# Patient Record
Sex: FEMALE | Race: WHITE | NOT HISPANIC OR LATINO | Employment: UNEMPLOYED | URBAN - NONMETROPOLITAN AREA
[De-identification: names, ages, dates, MRNs, and addresses within clinical notes are randomized per-mention and may not be internally consistent; named-entity substitution may affect disease eponyms.]

---

## 2017-02-11 ENCOUNTER — HOSPITAL ENCOUNTER (EMERGENCY)
Facility: HOSPITAL | Age: 22
Discharge: HOME | End: 2017-02-12
Attending: FAMILY MEDICINE | Admitting: FAMILY MEDICINE

## 2017-02-11 ENCOUNTER — APPOINTMENT (OUTPATIENT)
Dept: CT IMAGING | Facility: HOSPITAL | Age: 22
End: 2017-02-11

## 2017-02-11 DIAGNOSIS — N30.90 CYSTITIS: ICD-10-CM

## 2017-02-11 DIAGNOSIS — N83.202 CYST OF LEFT OVARY: Primary | ICD-10-CM

## 2017-02-11 LAB
ALBUMIN SERPL-MCNC: 4.2 G/DL (ref 3.5–5)
ALBUMIN/GLOB SERPL: 1.2 G/DL (ref 1.1–2.5)
ALP SERPL-CCNC: 87 U/L (ref 24–120)
ALT SERPL W P-5'-P-CCNC: 42 U/L (ref 0–54)
AMYLASE SERPL-CCNC: 64 U/L (ref 30–110)
ANION GAP SERPL CALCULATED.3IONS-SCNC: 12 MMOL/L (ref 4–13)
AST SERPL-CCNC: 37 U/L (ref 7–45)
BACTERIA UR QL AUTO: ABNORMAL /HPF
BASOPHILS # BLD AUTO: 0.04 10*3/MM3 (ref 0–0.2)
BASOPHILS NFR BLD AUTO: 0.3 % (ref 0–2)
BILIRUB SERPL-MCNC: 0.3 MG/DL (ref 0.1–1)
BILIRUB UR QL STRIP: NEGATIVE
BUN BLD-MCNC: 14 MG/DL (ref 5–21)
BUN/CREAT SERPL: 22.2 (ref 7–25)
CALCIUM SPEC-SCNC: 9.6 MG/DL (ref 8.4–10.4)
CHLORIDE SERPL-SCNC: 104 MMOL/L (ref 98–110)
CLARITY UR: ABNORMAL
CO2 SERPL-SCNC: 23 MMOL/L (ref 24–31)
COLOR UR: YELLOW
CREAT BLD-MCNC: 0.63 MG/DL (ref 0.5–1.4)
CRP SERPL-MCNC: 1.1 MG/DL (ref 0–0.99)
DEPRECATED RDW RBC AUTO: 46.5 FL (ref 40–54)
EOSINOPHIL # BLD AUTO: 0.17 10*3/MM3 (ref 0–0.7)
EOSINOPHIL NFR BLD AUTO: 1.3 % (ref 0–4)
ERYTHROCYTE [DISTWIDTH] IN BLOOD BY AUTOMATED COUNT: 15.3 % (ref 12–15)
GFR SERPL CREATININE-BSD FRML MDRD: 120 ML/MIN/1.73
GLOBULIN UR ELPH-MCNC: 3.5 GM/DL
GLUCOSE BLD-MCNC: 89 MG/DL (ref 70–100)
GLUCOSE UR STRIP-MCNC: NEGATIVE MG/DL
HCG INTACT+B SERPL-ACNC: <2.39 MIU/ML (ref 0–10)
HCT VFR BLD AUTO: 39 % (ref 37–47)
HGB BLD-MCNC: 13.1 G/DL (ref 12–16)
HGB UR QL STRIP.AUTO: ABNORMAL
HYALINE CASTS UR QL AUTO: ABNORMAL /LPF
IMM GRANULOCYTES # BLD: 0.04 10*3/MM3 (ref 0–0.03)
IMM GRANULOCYTES NFR BLD: 0.3 % (ref 0–5)
KETONES UR QL STRIP: NEGATIVE
LEUKOCYTE ESTERASE UR QL STRIP.AUTO: ABNORMAL
LIPASE SERPL-CCNC: 56 U/L (ref 23–203)
LYMPHOCYTES # BLD AUTO: 2.87 10*3/MM3 (ref 0.72–4.86)
LYMPHOCYTES NFR BLD AUTO: 22.2 % (ref 15–45)
MCH RBC QN AUTO: 28.1 PG (ref 28–32)
MCHC RBC AUTO-ENTMCNC: 33.6 G/DL (ref 33–36)
MCV RBC AUTO: 83.5 FL (ref 82–98)
MONOCYTES # BLD AUTO: 1.17 10*3/MM3 (ref 0.19–1.3)
MONOCYTES NFR BLD AUTO: 9 % (ref 4–12)
NEUTROPHILS # BLD AUTO: 8.66 10*3/MM3 (ref 1.87–8.4)
NEUTROPHILS NFR BLD AUTO: 66.9 % (ref 39–78)
NITRITE UR QL STRIP: POSITIVE
PH UR STRIP.AUTO: <=5 [PH] (ref 5–8)
PLATELET # BLD AUTO: 255 10*3/MM3 (ref 130–400)
PMV BLD AUTO: 10.9 FL (ref 6–12)
POTASSIUM BLD-SCNC: 3.7 MMOL/L (ref 3.5–5.3)
PROT SERPL-MCNC: 7.7 G/DL (ref 6.3–8.7)
PROT UR QL STRIP: ABNORMAL
RBC # BLD AUTO: 4.67 10*6/MM3 (ref 4.2–5.4)
RBC # UR: ABNORMAL /HPF
REF LAB TEST METHOD: ABNORMAL
SODIUM BLD-SCNC: 139 MMOL/L (ref 135–145)
SP GR UR STRIP: 1.02 (ref 1–1.03)
SQUAMOUS #/AREA URNS HPF: ABNORMAL /HPF
UROBILINOGEN UR QL STRIP: ABNORMAL
WBC NRBC COR # BLD: 12.95 10*3/MM3 (ref 4.8–10.8)
WBC UR QL AUTO: ABNORMAL /HPF

## 2017-02-11 PROCEDURE — 84702 CHORIONIC GONADOTROPIN TEST: CPT | Performed by: NURSE PRACTITIONER

## 2017-02-11 PROCEDURE — 85025 COMPLETE CBC W/AUTO DIFF WBC: CPT | Performed by: NURSE PRACTITIONER

## 2017-02-11 PROCEDURE — 81001 URINALYSIS AUTO W/SCOPE: CPT | Performed by: NURSE PRACTITIONER

## 2017-02-11 PROCEDURE — 82150 ASSAY OF AMYLASE: CPT | Performed by: NURSE PRACTITIONER

## 2017-02-11 PROCEDURE — 87186 SC STD MICRODIL/AGAR DIL: CPT | Performed by: NURSE PRACTITIONER

## 2017-02-11 PROCEDURE — 96361 HYDRATE IV INFUSION ADD-ON: CPT

## 2017-02-11 PROCEDURE — 87086 URINE CULTURE/COLONY COUNT: CPT | Performed by: NURSE PRACTITIONER

## 2017-02-11 PROCEDURE — 80053 COMPREHEN METABOLIC PANEL: CPT | Performed by: NURSE PRACTITIONER

## 2017-02-11 PROCEDURE — 99283 EMERGENCY DEPT VISIT LOW MDM: CPT

## 2017-02-11 PROCEDURE — 96374 THER/PROPH/DIAG INJ IV PUSH: CPT

## 2017-02-11 PROCEDURE — 25010000002 ONDANSETRON PER 1 MG: Performed by: NURSE PRACTITIONER

## 2017-02-11 PROCEDURE — 74176 CT ABD & PELVIS W/O CONTRAST: CPT

## 2017-02-11 PROCEDURE — 83690 ASSAY OF LIPASE: CPT | Performed by: NURSE PRACTITIONER

## 2017-02-11 PROCEDURE — 86140 C-REACTIVE PROTEIN: CPT | Performed by: NURSE PRACTITIONER

## 2017-02-11 RX ORDER — PRENATAL VIT NO.126/IRON/FOLIC 28MG-0.8MG
TABLET ORAL DAILY
COMMUNITY
End: 2020-01-03 | Stop reason: HOSPADM

## 2017-02-11 RX ORDER — ESCITALOPRAM OXALATE 20 MG/1
20 TABLET ORAL DAILY
COMMUNITY
End: 2020-01-03 | Stop reason: HOSPADM

## 2017-02-11 RX ORDER — ONDANSETRON 2 MG/ML
4 INJECTION INTRAMUSCULAR; INTRAVENOUS ONCE
Status: COMPLETED | OUTPATIENT
Start: 2017-02-11 | End: 2017-02-11

## 2017-02-11 RX ORDER — SODIUM CHLORIDE 0.9 % (FLUSH) 0.9 %
10 SYRINGE (ML) INJECTION AS NEEDED
Status: DISCONTINUED | OUTPATIENT
Start: 2017-02-11 | End: 2017-02-12 | Stop reason: HOSPADM

## 2017-02-11 RX ADMIN — ONDANSETRON 4 MG: 2 INJECTION INTRAMUSCULAR; INTRAVENOUS at 23:16

## 2017-02-11 RX ADMIN — SODIUM CHLORIDE 1000 ML: 9 INJECTION, SOLUTION INTRAVENOUS at 23:16

## 2017-02-12 ENCOUNTER — APPOINTMENT (OUTPATIENT)
Dept: ULTRASOUND IMAGING | Facility: HOSPITAL | Age: 22
End: 2017-02-12

## 2017-02-12 VITALS
DIASTOLIC BLOOD PRESSURE: 72 MMHG | HEART RATE: 69 BPM | RESPIRATION RATE: 16 BRPM | TEMPERATURE: 98.2 F | HEIGHT: 62 IN | WEIGHT: 207.5 LBS | OXYGEN SATURATION: 98 % | BODY MASS INDEX: 38.18 KG/M2 | SYSTOLIC BLOOD PRESSURE: 113 MMHG

## 2017-02-12 PROCEDURE — 76856 US EXAM PELVIC COMPLETE: CPT

## 2017-02-12 RX ORDER — CEFUROXIME AXETIL 500 MG/1
500 TABLET ORAL 2 TIMES DAILY
Qty: 20 TABLET | Refills: 0 | Status: SHIPPED | OUTPATIENT
Start: 2017-02-12 | End: 2017-02-27 | Stop reason: SINTOL

## 2017-02-12 RX ORDER — TRAMADOL HYDROCHLORIDE 50 MG/1
50 TABLET ORAL EVERY 8 HOURS PRN
Qty: 10 TABLET | Refills: 0 | OUTPATIENT
Start: 2017-02-12 | End: 2020-01-03 | Stop reason: HOSPADM

## 2017-02-12 NOTE — ED PROVIDER NOTES
Subjective   HPI Comments: Patient is a 21-year-old white female who presents here today with complaint of bilateral flank pain and difficulty with urination.  Patient reports symptoms been occurring for the past 2 days.  Patient reports that he has been having nausea for 2 days.  He also reports that she does as though her breast are getting bigger.  She reports she is concerned that she could be pregnant.  She states she took a home pregnancy test today however was negative.  Patient reports her LMP was 2017.  She is a  0.  Patient states she would like a blood pregnancy test.  Patient reports that she is having burning with urination.  She denies any blood in her urine she reports urinary frequency.  She presents here today for evaluation.    Patient is a 21 y.o. female presenting with flank pain.   History provided by:  Patient   used: No    Flank Pain   Pain location:  L flank and R flank  Pain quality: aching    Pain radiates to:  Does not radiate  Pain severity:  Mild  Onset quality:  Sudden  Duration:  2 days  Timing:  Constant  Progression:  Unchanged  Chronicity:  New  Context: not alcohol use, not awakening from sleep, not diet changes, not eating, not laxative use, not medication withdrawal, not previous surgeries, not recent illness, not recent sexual activity, not recent travel, not retching, not sick contacts, not suspicious food intake and not trauma    Relieved by:  Nothing  Worsened by:  Nothing  Ineffective treatments:  None tried  Associated symptoms: dysuria and nausea    Associated symptoms: no anorexia, no belching, no chest pain, no chills, no constipation, no cough, no diarrhea, no fatigue, no fever, no flatus, no hematemesis, no hematochezia, no hematuria, no melena, no shortness of breath, no sore throat, no vaginal bleeding, no vaginal discharge and no vomiting    Risk factors: no alcohol abuse, no aspirin use, not elderly, has not had multiple surgeries,  no NSAID use, not obese, not pregnant and no recent hospitalization        Review of Systems   Constitutional: Negative for chills, fatigue and fever.   HENT: Negative for sore throat.    Respiratory: Negative for cough and shortness of breath.    Cardiovascular: Negative for chest pain.   Gastrointestinal: Positive for nausea. Negative for anorexia, constipation, diarrhea, flatus, hematemesis, hematochezia, melena and vomiting.   Genitourinary: Positive for dysuria and flank pain. Negative for hematuria, vaginal bleeding and vaginal discharge.   All other systems reviewed and are negative.      Past Medical History   Diagnosis Date   • Depression        No Known Allergies    Past Surgical History   Procedure Laterality Date   • Ear tubes Bilateral        History reviewed. No pertinent family history.    Social History     Social History   • Marital status: Single     Spouse name: N/A   • Number of children: N/A   • Years of education: N/A     Social History Main Topics   • Smoking status: Current Every Day Smoker     Packs/day: 0.50     Years: 3.00     Types: Cigarettes   • Smokeless tobacco: None   • Alcohol use No   • Drug use: No   • Sexual activity: Defer     Other Topics Concern   • None     Social History Narrative   • None           Objective   Physical Exam   Constitutional: She is oriented to person, place, and time. She appears well-developed and well-nourished.   HENT:   Head: Normocephalic and atraumatic.   Eyes: Conjunctivae are normal. Pupils are equal, round, and reactive to light.   Neck: Normal range of motion. Neck supple.   Cardiovascular: Normal rate, regular rhythm and normal heart sounds.    Pulmonary/Chest: Effort normal and breath sounds normal.   Abdominal: Soft. Bowel sounds are normal. There is tenderness. There is CVA tenderness.   Musculoskeletal: Normal range of motion.   Neurological: She is alert and oriented to person, place, and time.   Skin: Skin is warm and dry.   Psychiatric:  She has a normal mood and affect.   Nursing note and vitals reviewed.      Procedures         ED Course  ED Course   Value Comment By Time   WBC: (!) 12.95 (Reviewed) Lissettefernando Raymond, APRN 02/11 3159    CT scan revealed left complex structure cyst radiology recommended evaluation by ultrasound.  Ultrasound revealed left complex hemorrhagic cyst.  There was no torsion good blood flow.  Patient diagnosed with urinary tract infection/cystitis without pyelonephritis.  Patient discharged in improved condition with prescription for Ultram for pain and Ceftin 1 mg twice a day ×10 days for cystitis. Tina Fuller,  02/12 0323    All findings discussed with patient she indicated agreement and understanding of the treatment plan.  Patient will return as needed or if symptoms change or worsen. Tina Fuller,  02/12 0323                  [facility]    Final diagnoses:   Cyst of left ovary   Cystitis            Tina Fuller,   02/12/17 0323

## 2017-02-12 NOTE — DISCHARGE INSTRUCTIONS

## 2017-02-13 LAB — BACTERIA SPEC AEROBE CULT: ABNORMAL

## 2017-02-27 ENCOUNTER — OFFICE VISIT (OUTPATIENT)
Dept: RETAIL CLINIC | Facility: CLINIC | Age: 22
End: 2017-02-27

## 2017-02-27 VITALS
DIASTOLIC BLOOD PRESSURE: 77 MMHG | OXYGEN SATURATION: 98 % | HEART RATE: 81 BPM | TEMPERATURE: 98.2 F | SYSTOLIC BLOOD PRESSURE: 103 MMHG

## 2017-02-27 DIAGNOSIS — M54.5 ACUTE MIDLINE LOW BACK PAIN, WITH SCIATICA PRESENCE UNSPECIFIED: ICD-10-CM

## 2017-02-27 DIAGNOSIS — J01.40 ACUTE PANSINUSITIS, RECURRENCE NOT SPECIFIED: Primary | ICD-10-CM

## 2017-02-27 LAB
BILIRUB BLD-MCNC: NEGATIVE MG/DL
CLARITY, POC: CLEAR
COLOR UR: YELLOW
GLUCOSE UR STRIP-MCNC: NEGATIVE MG/DL
KETONES UR QL: NEGATIVE
LEUKOCYTE EST, POC: NEGATIVE
NITRITE UR-MCNC: NEGATIVE MG/ML
PH UR: 6 [PH] (ref 5–8)
PROT UR STRIP-MCNC: NEGATIVE MG/DL
RBC # UR STRIP: ABNORMAL /UL
SP GR UR: 1.03 (ref 1–1.03)
UROBILINOGEN UR QL: NORMAL

## 2017-02-27 PROCEDURE — 81003 URINALYSIS AUTO W/O SCOPE: CPT | Performed by: NURSE PRACTITIONER

## 2017-02-27 PROCEDURE — 99213 OFFICE O/P EST LOW 20 MIN: CPT | Performed by: NURSE PRACTITIONER

## 2017-02-27 RX ORDER — AZITHROMYCIN 250 MG/1
TABLET, FILM COATED ORAL
Qty: 6 TABLET | Refills: 0 | OUTPATIENT
Start: 2017-02-27 | End: 2020-01-03 | Stop reason: HOSPADM

## 2017-02-27 RX ORDER — FLUTICASONE PROPIONATE 50 MCG
2 SPRAY, SUSPENSION (ML) NASAL DAILY
Qty: 1 EACH | Refills: 0 | Status: SHIPPED | OUTPATIENT
Start: 2017-02-27 | End: 2017-03-29

## 2017-02-27 NOTE — PROGRESS NOTES
"Subjective   Akilah Avila is a 21 y.o. female.     Earache    There is pain in both ears. The current episode started in the past 7 days (2-3 days). The problem has been gradually worsening. There has been no fever. Associated symptoms include coughing, headaches, rhinorrhea and a sore throat. Pertinent negatives include no diarrhea or vomiting. She has tried NSAIDs for the symptoms. The treatment provided no relief.   Back Pain   This is a chronic (But worse today; Recently in ER and told she has cyst and cystitis.  Did not finish antibiotic) problem. The problem has been gradually worsening since onset. The quality of the pain is described as shooting (Every once in a while). The symptoms are aggravated by sitting, bending and lying down. Associated symptoms include chest pain (Chest \"soreness\" Started this morning) and headaches. Pertinent negatives include no dysuria, fever or pelvic pain.        The following portions of the patient's history were reviewed and updated as appropriate: allergies, current medications, past family history, past medical history, past social history, past surgical history and problem list.    Review of Systems   Constitutional: Negative for fever.   HENT: Positive for ear pain, rhinorrhea and sore throat.    Respiratory: Positive for cough. Negative for shortness of breath.    Cardiovascular: Positive for chest pain (Chest \"soreness\" Started this morning). Negative for palpitations.   Gastrointestinal: Negative for diarrhea and vomiting.   Genitourinary: Negative for dysuria and pelvic pain.   Musculoskeletal: Positive for back pain.   Neurological: Positive for headaches.       Objective   Physical Exam   Constitutional: She appears well-developed and well-nourished. No distress.   HENT:   Right Ear: External ear normal. Tympanic membrane is not erythematous and not bulging.   Left Ear: External ear normal. Tympanic membrane is not erythematous and not bulging.   Nose: Right " sinus exhibits maxillary sinus tenderness and frontal sinus tenderness. Left sinus exhibits maxillary sinus tenderness and frontal sinus tenderness.   Mouth/Throat: Oropharynx is clear and moist. No oropharyngeal exudate or posterior oropharyngeal erythema. Tonsils are 2+ on the right. Tonsils are 2+ on the left. No tonsillar exudate.   Eyes: Conjunctivae are normal. Pupils are equal, round, and reactive to light. Right eye exhibits no discharge. Left eye exhibits no discharge.   Neck: Neck supple.   Cardiovascular: Normal rate, regular rhythm and normal heart sounds.  Exam reveals no gallop and no friction rub.    No murmur heard.  Able to reproduce chest discomfort with slouching and moving arm certain ways.    Pulmonary/Chest: Effort normal and breath sounds normal. No respiratory distress. She has no wheezes. She has no rales. She exhibits tenderness.   Lymphadenopathy:     She has no cervical adenopathy.   Neurological: She is alert.   Skin: Skin is warm. She is not diaphoretic.   Psychiatric: She has a normal mood and affect. Her behavior is normal.       Assessment/Plan   Akilah was seen today for earache and back pain.    Diagnoses and all orders for this visit:    Acute pansinusitis, recurrence not specified  -     POC Urinalysis Dipstick, Automated    Acute midline low back pain, with sciatica presence unspecified    Other orders  -     fluticasone (FLONASE) 50 MCG/ACT nasal spray; 2 sprays into each nostril Daily for 30 days.  -     azithromycin (ZITHROMAX Z-JESSICA) 250 MG tablet; Take 2 tablets the first day, then 1 tablet daily for 4 days.      Increase fluid intake  Warm salt water gargles as needed for sore throat  Do not over suppress cough  If no improvement over next 2-3 days or symptoms worsen, follow up with PCP  Chest wall pain.  If Chest pain worsens, you become SOB or other concerning symptoms start, go to ER!!  Follow up with GYN regarding Cyst.

## 2017-02-27 NOTE — PATIENT INSTRUCTIONS
Increase fluid intake  Warm salt water gargles as needed for sore throat  Do not over suppress cough  If no improvement over next 2-3 days or symptoms worsen, follow up with PCP  If Chest pain worsens, you become SOB or other concerning symptoms start, go to ER!!  Follow up with GYN regarding Cyst.  Watch and wait on antibiotic. May start in next 2-3 days is symptoms worsen.  If significantly worse, or your develop fever, follow up with PCP or go to Urgent Care      Sinusitis, Adult  Sinusitis is redness, soreness, and inflammation of the paranasal sinuses. Paranasal sinuses are air pockets within the bones of your face. They are located beneath your eyes, in the middle of your forehead, and above your eyes. In healthy paranasal sinuses, mucus is able to drain out, and air is able to circulate through them by way of your nose. However, when your paranasal sinuses are inflamed, mucus and air can become trapped. This can allow bacteria and other germs to grow and cause infection.  Sinusitis can develop quickly and last only a short time (acute) or continue over a long period (chronic). Sinusitis that lasts for more than 12 weeks is considered chronic.  CAUSES  Causes of sinusitis include:  · Allergies.  · Structural abnormalities, such as displacement of the cartilage that separates your nostrils (deviated septum), which can decrease the air flow through your nose and sinuses and affect sinus drainage.  · Functional abnormalities, such as when the small hairs (cilia) that line your sinuses and help remove mucus do not work properly or are not present.  SIGNS AND SYMPTOMS  Symptoms of acute and chronic sinusitis are the same. The primary symptoms are pain and pressure around the affected sinuses. Other symptoms include:  · Upper toothache.  · Earache.  · Headache.  · Bad breath.  · Decreased sense of smell and taste.  · A cough, which worsens when you are lying flat.  · Fatigue.  · Fever.  · Thick drainage from your  nose, which often is green and may contain pus (purulent).  · Swelling and warmth over the affected sinuses.  DIAGNOSIS  Your health care provider will perform a physical exam. During your exam, your health care provider may perform any of the following to help determine if you have acute sinusitis or chronic sinusitis:  · Look in your nose for signs of abnormal growths in your nostrils (nasal polyps).  · Tap over the affected sinus to check for signs of infection.  · View the inside of your sinuses using an imaging device that has a light attached (endoscope).  If your health care provider suspects that you have chronic sinusitis, one or more of the following tests may be recommended:  · Allergy tests.  · Nasal culture. A sample of mucus is taken from your nose, sent to a lab, and screened for bacteria.  · Nasal cytology. A sample of mucus is taken from your nose and examined by your health care provider to determine if your sinusitis is related to an allergy.  TREATMENT  Most cases of acute sinusitis are related to a viral infection and will resolve on their own within 10 days. Sometimes, medicines are prescribed to help relieve symptoms of both acute and chronic sinusitis. These may include pain medicines, decongestants, nasal steroid sprays, or saline sprays.  However, for sinusitis related to a bacterial infection, your health care provider will prescribe antibiotic medicines. These are medicines that will help kill the bacteria causing the infection.  Rarely, sinusitis is caused by a fungal infection. In these cases, your health care provider will prescribe antifungal medicine.  For some cases of chronic sinusitis, surgery is needed. Generally, these are cases in which sinusitis recurs more than 3 times per year, despite other treatments.  HOME CARE INSTRUCTIONS  · Drink plenty of water. Water helps thin the mucus so your sinuses can drain more easily.  · Use a humidifier.  · Inhale steam 3-4 times a day (for  example, sit in the bathroom with the shower running).  · Apply a warm, moist washcloth to your face 3-4 times a day, or as directed by your health care provider.  · Use saline nasal sprays to help moisten and clean your sinuses.  · Take medicines only as directed by your health care provider.  · If you were prescribed either an antibiotic or antifungal medicine, finish it all even if you start to feel better.  SEEK IMMEDIATE MEDICAL CARE IF:  · You have increasing pain or severe headaches.  · You have nausea, vomiting, or drowsiness.  · You have swelling around your face.  · You have vision problems.  · You have a stiff neck.  · You have difficulty breathing.     This information is not intended to replace advice given to you by your health care provider. Make sure you discuss any questions you have with your health care provider.     Document Released: 12/18/2006 Document Revised: 01/08/2016 Document Reviewed: 10/12/2016  [employer] Patient Education ©2016 [employer]

## 2017-03-22 PROCEDURE — 99283 EMERGENCY DEPT VISIT LOW MDM: CPT

## 2017-03-23 ENCOUNTER — HOSPITAL ENCOUNTER (EMERGENCY)
Facility: HOSPITAL | Age: 22
Discharge: HOME | End: 2017-03-23
Attending: EMERGENCY MEDICINE | Admitting: EMERGENCY MEDICINE

## 2017-03-23 ENCOUNTER — APPOINTMENT (OUTPATIENT)
Dept: GENERAL RADIOLOGY | Facility: HOSPITAL | Age: 22
End: 2017-03-23

## 2017-03-23 VITALS
HEIGHT: 62 IN | BODY MASS INDEX: 39.75 KG/M2 | TEMPERATURE: 97.7 F | OXYGEN SATURATION: 100 % | SYSTOLIC BLOOD PRESSURE: 127 MMHG | HEART RATE: 72 BPM | RESPIRATION RATE: 15 BRPM | DIASTOLIC BLOOD PRESSURE: 68 MMHG | WEIGHT: 216 LBS

## 2017-03-23 DIAGNOSIS — R05.9 COUGH: Primary | ICD-10-CM

## 2017-03-23 DIAGNOSIS — G43.009 MIGRAINE WITHOUT AURA AND WITHOUT STATUS MIGRAINOSUS, NOT INTRACTABLE: ICD-10-CM

## 2017-03-23 LAB
ALBUMIN SERPL-MCNC: 3.9 G/DL (ref 3.5–5)
ALBUMIN/GLOB SERPL: 1.1 G/DL (ref 1.1–2.5)
ALP SERPL-CCNC: 82 U/L (ref 24–120)
ALT SERPL W P-5'-P-CCNC: 23 U/L (ref 0–54)
AMYLASE SERPL-CCNC: 63 U/L (ref 30–110)
ANION GAP SERPL CALCULATED.3IONS-SCNC: 11 MMOL/L (ref 4–13)
AST SERPL-CCNC: 33 U/L (ref 7–45)
BASOPHILS # BLD AUTO: 0.03 10*3/MM3 (ref 0–0.2)
BASOPHILS NFR BLD AUTO: 0.3 % (ref 0–2)
BILIRUB SERPL-MCNC: 0.3 MG/DL (ref 0.1–1)
BILIRUB UR QL STRIP: NEGATIVE
BUN BLD-MCNC: 10 MG/DL (ref 5–21)
BUN/CREAT SERPL: 17.5 (ref 7–25)
CALCIUM SPEC-SCNC: 8.9 MG/DL (ref 8.4–10.4)
CHLORIDE SERPL-SCNC: 105 MMOL/L (ref 98–110)
CLARITY UR: ABNORMAL
CO2 SERPL-SCNC: 26 MMOL/L (ref 24–31)
COLOR UR: YELLOW
CREAT BLD-MCNC: 0.57 MG/DL (ref 0.5–1.4)
D DIMER PPP FEU-MCNC: <0.22 MG/L (FEU) (ref 0–0.5)
DEPRECATED RDW RBC AUTO: 45.9 FL (ref 40–54)
EOSINOPHIL # BLD AUTO: 0.22 10*3/MM3 (ref 0–0.7)
EOSINOPHIL NFR BLD AUTO: 2.5 % (ref 0–4)
ERYTHROCYTE [DISTWIDTH] IN BLOOD BY AUTOMATED COUNT: 15.1 % (ref 12–15)
GFR SERPL CREATININE-BSD FRML MDRD: 135 ML/MIN/1.73
GLOBULIN UR ELPH-MCNC: 3.5 GM/DL
GLUCOSE BLD-MCNC: 99 MG/DL (ref 70–100)
GLUCOSE UR STRIP-MCNC: NEGATIVE MG/DL
HCG SERPL QL: NEGATIVE
HCT VFR BLD AUTO: 38.6 % (ref 37–47)
HGB BLD-MCNC: 12.8 G/DL (ref 12–16)
HGB UR QL STRIP.AUTO: NEGATIVE
IMM GRANULOCYTES # BLD: 0.02 10*3/MM3 (ref 0–0.03)
IMM GRANULOCYTES NFR BLD: 0.2 % (ref 0–5)
KETONES UR QL STRIP: NEGATIVE
LEUKOCYTE ESTERASE UR QL STRIP.AUTO: NEGATIVE
LIPASE SERPL-CCNC: 53 U/L (ref 23–203)
LYMPHOCYTES # BLD AUTO: 2.88 10*3/MM3 (ref 0.72–4.86)
LYMPHOCYTES NFR BLD AUTO: 32.7 % (ref 15–45)
MCH RBC QN AUTO: 27.6 PG (ref 28–32)
MCHC RBC AUTO-ENTMCNC: 33.2 G/DL (ref 33–36)
MCV RBC AUTO: 83.2 FL (ref 82–98)
MONOCYTES # BLD AUTO: 0.72 10*3/MM3 (ref 0.19–1.3)
MONOCYTES NFR BLD AUTO: 8.2 % (ref 4–12)
NEUTROPHILS # BLD AUTO: 4.94 10*3/MM3 (ref 1.87–8.4)
NEUTROPHILS NFR BLD AUTO: 56.1 % (ref 39–78)
NITRITE UR QL STRIP: NEGATIVE
NT-PROBNP SERPL-MCNC: <12 PG/ML (ref 0–450)
PH UR STRIP.AUTO: 7 [PH] (ref 5–8)
PLATELET # BLD AUTO: 267 10*3/MM3 (ref 130–400)
PMV BLD AUTO: 10.2 FL (ref 6–12)
POTASSIUM BLD-SCNC: 4 MMOL/L (ref 3.5–5.3)
PROT SERPL-MCNC: 7.4 G/DL (ref 6.3–8.7)
PROT UR QL STRIP: NEGATIVE
RBC # BLD AUTO: 4.64 10*6/MM3 (ref 4.2–5.4)
SODIUM BLD-SCNC: 142 MMOL/L (ref 135–145)
SP GR UR STRIP: 1.02 (ref 1–1.03)
UROBILINOGEN UR QL STRIP: ABNORMAL
WBC NRBC COR # BLD: 8.81 10*3/MM3 (ref 4.8–10.8)

## 2017-03-23 PROCEDURE — 85025 COMPLETE CBC W/AUTO DIFF WBC: CPT | Performed by: EMERGENCY MEDICINE

## 2017-03-23 PROCEDURE — 83880 ASSAY OF NATRIURETIC PEPTIDE: CPT | Performed by: EMERGENCY MEDICINE

## 2017-03-23 PROCEDURE — 81003 URINALYSIS AUTO W/O SCOPE: CPT | Performed by: EMERGENCY MEDICINE

## 2017-03-23 PROCEDURE — 85379 FIBRIN DEGRADATION QUANT: CPT | Performed by: EMERGENCY MEDICINE

## 2017-03-23 PROCEDURE — 80053 COMPREHEN METABOLIC PANEL: CPT | Performed by: EMERGENCY MEDICINE

## 2017-03-23 PROCEDURE — 82150 ASSAY OF AMYLASE: CPT | Performed by: EMERGENCY MEDICINE

## 2017-03-23 PROCEDURE — 84703 CHORIONIC GONADOTROPIN ASSAY: CPT | Performed by: EMERGENCY MEDICINE

## 2017-03-23 PROCEDURE — 71020 HC CHEST PA AND LATERAL: CPT

## 2017-03-23 PROCEDURE — 83690 ASSAY OF LIPASE: CPT | Performed by: EMERGENCY MEDICINE

## 2017-03-23 RX ORDER — SULFAMETHOXAZOLE AND TRIMETHOPRIM 800; 160 MG/1; MG/1
1 TABLET ORAL 2 TIMES DAILY
Qty: 6 TABLET | Refills: 0 | Status: SHIPPED | OUTPATIENT
Start: 2017-03-23 | End: 2017-03-26

## 2017-03-23 NOTE — ED PROVIDER NOTES
Subjective   HPI Comments: Patient is a 21-year-old female who reports that she has had low back pain, nausea, cough productive for green phlegm, shortness of breath and intermittent global headache for the last 5 days.  Patient reports that her headache has been worsened by bright light and resolved by Excedrin for her headache.  Patient reports that she saw her primary care provider this Wednesday (03/22/17) and was tested negative for pregnancy.  Patient reports that she was diagnosed with a left ovarian cyst about one month ago.      History provided by:  Patient      Review of Systems   Constitutional: Negative.    Eyes: Negative.    Respiratory: Positive for cough and shortness of breath.    Cardiovascular: Negative.    Gastrointestinal: Positive for nausea.   Endocrine: Negative.    Genitourinary: Negative.    Musculoskeletal: Positive for back pain.   Skin: Negative.    Allergic/Immunologic: Negative.    Neurological: Positive for headaches.   Hematological: Negative.    Psychiatric/Behavioral: Negative.    All other systems reviewed and are negative.      Past Medical History:   Diagnosis Date   • Back pain    • Bipolar disorder    • Depression        No Known Allergies    Past Surgical History:   Procedure Laterality Date   • EAR TUBES Bilateral        History reviewed. No pertinent family history.    Social History     Social History   • Marital status:      Spouse name: N/A   • Number of children: N/A   • Years of education: N/A     Social History Main Topics   • Smoking status: Current Every Day Smoker     Packs/day: 0.50     Years: 3.00     Types: Cigarettes   • Smokeless tobacco: None   • Alcohol use No   • Drug use: No   • Sexual activity: Defer     Other Topics Concern   • None     Social History Narrative   • None           Objective   Physical Exam   Constitutional: She is oriented to person, place, and time. She appears well-developed and well-nourished.   HENT:   Head: Normocephalic and  atraumatic.   Right Ear: External ear normal.   Left Ear: External ear normal.   Nose: Nose normal.   Mouth/Throat: Oropharynx is clear and moist.   Eyes: Conjunctivae and EOM are normal. Pupils are equal, round, and reactive to light. Right eye exhibits no discharge. Left eye exhibits no discharge.   Neck: Normal range of motion. Neck supple. No tracheal deviation present. No thyromegaly present.   Cardiovascular: Normal rate, regular rhythm, normal heart sounds and intact distal pulses.    No murmur heard.  Pulmonary/Chest: Effort normal and breath sounds normal. No respiratory distress. She exhibits no tenderness.   Abdominal: Soft. She exhibits no distension. There is tenderness (Mild left mid abdominal pain to palpation.).   Musculoskeletal: Normal range of motion. She exhibits no edema, tenderness or deformity.   Neurological: She is alert and oriented to person, place, and time. No cranial nerve deficit.   Skin: Skin is warm and dry. No erythema. No pallor.   Psychiatric: She has a normal mood and affect. Judgment normal.   Nursing note and vitals reviewed.      Procedures         ED Course  ED Course                  MDM  Number of Diagnoses or Management Options  Cough: minor  Migraine without aura and without status migrainosus, not intractable: minor     Amount and/or Complexity of Data Reviewed  Clinical lab tests: ordered and reviewed  Tests in the radiology section of CPT®: ordered and reviewed  Independent visualization of images, tracings, or specimens: yes    Risk of Complications, Morbidity, and/or Mortality  Presenting problems: moderate  Diagnostic procedures: moderate  Management options: low    Patient Progress  Patient progress: stable      Final diagnoses:   Cough   Migraine without aura and without status migrainosus, not intractable            Lauro Hollingsworth MD  03/23/17 8843

## 2019-06-17 ENCOUNTER — HOSPITAL ENCOUNTER (EMERGENCY)
Facility: HOSPITAL | Age: 24
Discharge: HOME | End: 2019-06-17
Admitting: EMERGENCY MEDICINE

## 2019-06-17 VITALS
DIASTOLIC BLOOD PRESSURE: 71 MMHG | SYSTOLIC BLOOD PRESSURE: 118 MMHG | OXYGEN SATURATION: 96 % | HEART RATE: 86 BPM | RESPIRATION RATE: 16 BRPM | BODY MASS INDEX: 32.76 KG/M2 | HEIGHT: 62 IN | WEIGHT: 178 LBS | TEMPERATURE: 97.4 F

## 2019-06-17 DIAGNOSIS — J02.9 SORE THROAT: Primary | ICD-10-CM

## 2019-06-17 DIAGNOSIS — R31.9 HEMATURIA, UNSPECIFIED TYPE: ICD-10-CM

## 2019-06-17 DIAGNOSIS — M54.41 ACUTE BILATERAL LOW BACK PAIN WITH RIGHT-SIDED SCIATICA: ICD-10-CM

## 2019-06-17 LAB
B-HCG UR QL: NEGATIVE
BACTERIA UR QL AUTO: ABNORMAL /HPF
BILIRUB UR QL STRIP: NEGATIVE
CLARITY UR: CLEAR
COLOR UR: YELLOW
GLUCOSE UR STRIP-MCNC: NEGATIVE MG/DL
HGB UR QL STRIP.AUTO: ABNORMAL
HYALINE CASTS UR QL AUTO: ABNORMAL /LPF
INTERNAL NEGATIVE CONTROL: NEGATIVE
INTERNAL POSITIVE CONTROL: POSITIVE
KETONES UR QL STRIP: NEGATIVE
LEUKOCYTE ESTERASE UR QL STRIP.AUTO: NEGATIVE
Lab: NORMAL
NITRITE UR QL STRIP: NEGATIVE
PH UR STRIP.AUTO: 6 [PH] (ref 5–8)
PROT UR QL STRIP: NEGATIVE
RBC # UR: ABNORMAL /HPF
REF LAB TEST METHOD: ABNORMAL
S PYO AG THROAT QL: NEGATIVE
SP GR UR STRIP: 1.01 (ref 1–1.03)
SQUAMOUS #/AREA URNS HPF: ABNORMAL /HPF
UROBILINOGEN UR QL STRIP: ABNORMAL
WBC UR QL AUTO: ABNORMAL /HPF

## 2019-06-17 PROCEDURE — 25010000002 ORPHENADRINE CITRATE PER 60 MG: Performed by: PHYSICIAN ASSISTANT

## 2019-06-17 PROCEDURE — 87081 CULTURE SCREEN ONLY: CPT | Performed by: PHYSICIAN ASSISTANT

## 2019-06-17 PROCEDURE — 87880 STREP A ASSAY W/OPTIC: CPT | Performed by: PHYSICIAN ASSISTANT

## 2019-06-17 PROCEDURE — 81025 URINE PREGNANCY TEST: CPT | Performed by: PHYSICIAN ASSISTANT

## 2019-06-17 PROCEDURE — 87077 CULTURE AEROBIC IDENTIFY: CPT | Performed by: PHYSICIAN ASSISTANT

## 2019-06-17 PROCEDURE — 96372 THER/PROPH/DIAG INJ SC/IM: CPT

## 2019-06-17 PROCEDURE — 99283 EMERGENCY DEPT VISIT LOW MDM: CPT

## 2019-06-17 PROCEDURE — 81001 URINALYSIS AUTO W/SCOPE: CPT | Performed by: PHYSICIAN ASSISTANT

## 2019-06-17 PROCEDURE — 25010000002 KETOROLAC TROMETHAMINE PER 15 MG: Performed by: PHYSICIAN ASSISTANT

## 2019-06-17 RX ORDER — ORPHENADRINE CITRATE 30 MG/ML
60 INJECTION INTRAMUSCULAR; INTRAVENOUS ONCE
Status: COMPLETED | OUTPATIENT
Start: 2019-06-17 | End: 2019-06-17

## 2019-06-17 RX ORDER — KETOROLAC TROMETHAMINE 15 MG/ML
15 INJECTION, SOLUTION INTRAMUSCULAR; INTRAVENOUS ONCE
Status: COMPLETED | OUTPATIENT
Start: 2019-06-17 | End: 2019-06-17

## 2019-06-17 RX ORDER — CEPHALEXIN 500 MG/1
500 CAPSULE ORAL 2 TIMES DAILY
Qty: 10 CAPSULE | Refills: 0 | Status: SHIPPED | OUTPATIENT
Start: 2019-06-17 | End: 2019-06-22

## 2019-06-17 RX ORDER — CYCLOBENZAPRINE HCL 5 MG
5 TABLET ORAL 3 TIMES DAILY PRN
Qty: 10 TABLET | Refills: 0 | Status: SHIPPED | OUTPATIENT
Start: 2019-06-17 | End: 2019-06-27

## 2019-06-17 RX ADMIN — ORPHENADRINE CITRATE 60 MG: 30 INJECTION INTRAMUSCULAR; INTRAVENOUS at 15:27

## 2019-06-17 RX ADMIN — KETOROLAC TROMETHAMINE 15 MG: 15 INJECTION, SOLUTION INTRAMUSCULAR; INTRAVENOUS at 15:28

## 2019-06-20 LAB — BACTERIA SPEC AEROBE CULT: ABNORMAL

## 2020-01-03 ENCOUNTER — HOSPITAL ENCOUNTER (EMERGENCY)
Facility: HOSPITAL | Age: 25
Discharge: HOME | End: 2020-01-03
Admitting: EMERGENCY MEDICINE

## 2020-01-03 ENCOUNTER — APPOINTMENT (OUTPATIENT)
Dept: GENERAL RADIOLOGY | Facility: HOSPITAL | Age: 25
End: 2020-01-03

## 2020-01-03 VITALS
RESPIRATION RATE: 18 BRPM | HEIGHT: 62 IN | WEIGHT: 186 LBS | SYSTOLIC BLOOD PRESSURE: 122 MMHG | HEART RATE: 98 BPM | BODY MASS INDEX: 34.23 KG/M2 | OXYGEN SATURATION: 99 % | TEMPERATURE: 98.8 F | DIASTOLIC BLOOD PRESSURE: 73 MMHG

## 2020-01-03 DIAGNOSIS — J06.9 UPPER RESPIRATORY TRACT INFECTION, UNSPECIFIED TYPE: ICD-10-CM

## 2020-01-03 DIAGNOSIS — Z34.90 PREGNANCY, UNSPECIFIED GESTATIONAL AGE: ICD-10-CM

## 2020-01-03 DIAGNOSIS — J10.1 INFLUENZA B: Primary | ICD-10-CM

## 2020-01-03 LAB
B-HCG UR QL: POSITIVE
FLUAV AG NPH QL: NEGATIVE
FLUBV AG NPH QL IA: POSITIVE
INTERNAL NEGATIVE CONTROL: NEGATIVE
INTERNAL POSITIVE CONTROL: POSITIVE
Lab: ABNORMAL
S PYO AG THROAT QL: NEGATIVE

## 2020-01-03 PROCEDURE — 81025 URINE PREGNANCY TEST: CPT | Performed by: NURSE PRACTITIONER

## 2020-01-03 PROCEDURE — 99283 EMERGENCY DEPT VISIT LOW MDM: CPT

## 2020-01-03 PROCEDURE — 94640 AIRWAY INHALATION TREATMENT: CPT

## 2020-01-03 PROCEDURE — 87081 CULTURE SCREEN ONLY: CPT | Performed by: NURSE PRACTITIONER

## 2020-01-03 PROCEDURE — 87880 STREP A ASSAY W/OPTIC: CPT | Performed by: NURSE PRACTITIONER

## 2020-01-03 PROCEDURE — 87804 INFLUENZA ASSAY W/OPTIC: CPT | Performed by: NURSE PRACTITIONER

## 2020-01-03 RX ORDER — IPRATROPIUM BROMIDE AND ALBUTEROL SULFATE 2.5; .5 MG/3ML; MG/3ML
3 SOLUTION RESPIRATORY (INHALATION) ONCE
Status: COMPLETED | OUTPATIENT
Start: 2020-01-03 | End: 2020-01-03

## 2020-01-03 RX ORDER — OSELTAMIVIR PHOSPHATE 75 MG/1
75 CAPSULE ORAL 2 TIMES DAILY
Qty: 10 CAPSULE | Refills: 0 | Status: SHIPPED | OUTPATIENT
Start: 2020-01-03 | End: 2020-01-08

## 2020-01-03 RX ORDER — CETIRIZINE HYDROCHLORIDE 10 MG/1
10 TABLET ORAL DAILY
Qty: 20 TABLET | Refills: 0 | Status: SHIPPED | OUTPATIENT
Start: 2020-01-03 | End: 2020-02-20

## 2020-01-03 RX ORDER — ALBUTEROL SULFATE 90 UG/1
2 AEROSOL, METERED RESPIRATORY (INHALATION) EVERY 4 HOURS PRN
Qty: 18 G | Refills: 0 | Status: SHIPPED | OUTPATIENT
Start: 2020-01-03 | End: 2020-02-20

## 2020-01-03 RX ORDER — AZITHROMYCIN 250 MG/1
TABLET, FILM COATED ORAL
Qty: 6 TABLET | Refills: 0 | Status: SHIPPED | OUTPATIENT
Start: 2020-01-03 | End: 2020-02-20

## 2020-01-03 RX ADMIN — IPRATROPIUM BROMIDE AND ALBUTEROL SULFATE 3 ML: 2.5; .5 SOLUTION RESPIRATORY (INHALATION) at 20:32

## 2020-01-04 NOTE — DISCHARGE INSTRUCTIONS
"  Cool Mist Vaporizer  A cool mist vaporizer is a device that releases a cool mist into the air. If you have a cough or a cold, using a vaporizer may help relieve your symptoms. The mist adds moisture to the air, which may help thin your mucus and make it less sticky. When your mucus is thin and less sticky, it easier for you to breathe and to cough up secretions.  Do not use a vaporizer if you are allergic to mold.  Follow these instructions at home:  · Follow the instructions that come with the vaporizer.  · Do not use anything other than distilled water in the vaporizer.  · Do not run the vaporizer all of the time. Doing that can cause mold or bacteria to grow in the vaporizer.  · Clean the vaporizer after each time that you use it.  · Clean and dry the vaporizer well before storing it.  · Stop using the vaporizer if your breathing symptoms get worse.  This information is not intended to replace advice given to you by your health care provider. Make sure you discuss any questions you have with your health care provider.  Document Released: 09/14/2005 Document Revised: 07/07/2017 Document Reviewed: 03/18/2017  [employer] Interactive Patient Education © 2019 [employer]      Influenza, Adult  Influenza is also called \"the flu.\" It is an infection in the lungs, nose, and throat (respiratory tract). It is caused by a virus. The flu causes symptoms that are similar to symptoms of a cold. It also causes a high fever and body aches.  The flu spreads easily from person to person (is contagious). Getting a flu shot (influenza vaccination) every year is the best way to prevent the flu.  What are the causes?  This condition is caused by the influenza virus. You can get the virus by:  · Breathing in droplets that are in the air from the cough or sneeze of a person who has the virus.  · Touching something that has the virus on it (is contaminated) and then touching your mouth, nose, or eyes.  What increases the risk?  Certain " things may make you more likely to get the flu. These include:  · Not washing your hands often.  · Having close contact with many people during cold and flu season.  · Touching your mouth, eyes, or nose without first washing your hands.  · Not getting a flu shot every year.  You may have a higher risk for the flu, along with serious problems such as a lung infection (pneumonia), if you:  · Are older than 65.  · Are pregnant.  · Have a weakened disease-fighting system (immune system) because of a disease or taking certain medicines.  · Have a long-term (chronic) illness, such as:  ? Heart, kidney, or lung disease.  ? Diabetes.  ? Asthma.  · Have a liver disorder.  · Are very overweight (morbidly obese).  · Have anemia. This is a condition that affects your red blood cells.  What are the signs or symptoms?  Symptoms usually begin suddenly and last 4-14 days. They may include:  · Fever and chills.  · Headaches, body aches, or muscle aches.  · Sore throat.  · Cough.  · Runny or stuffy (congested) nose.  · Chest discomfort.  · Not wanting to eat as much as normal (poor appetite).  · Weakness or feeling tired (fatigue).  · Dizziness.  · Feeling sick to your stomach (nauseous) or throwing up (vomiting).  How is this treated?  If the flu is found early, you can be treated with medicine that can help reduce how bad the illness is and how long it lasts (antiviral medicine). This may be given by mouth (orally) or through an IV tube.  Taking care of yourself at home can help your symptoms get better. Your doctor may suggest:  · Taking over-the-counter medicines.  · Drinking plenty of fluids.  The flu often goes away on its own. If you have very bad symptoms or other problems, you may be treated in a hospital.  Follow these instructions at home:         Activity  · Rest as needed. Get plenty of sleep.  · Stay home from work or school as told by your doctor.  ? Do not leave home until you do not have a fever for 24 hours without  taking medicine.  ? Leave home only to visit your doctor.  Eating and drinking  · Take an ORS (oral rehydration solution). This is a drink that is sold at pharmacies and stores.  · Drink enough fluid to keep your pee (urine) pale yellow.  · Drink clear fluids in small amounts as you are able. Clear fluids include:  ? Water.  ? Ice chips.  ? Fruit juice that has water added (diluted fruit juice).  ? Low-calorie sports drinks.  · Eat bland, easy-to-digest foods in small amounts as you are able. These foods include:  ? Bananas.  ? Applesauce.  ? Rice.  ? Lean meats.  ? Toast.  ? Crackers.  · Do not eat or drink:  ? Fluids that have a lot of sugar or caffeine.  ? Alcohol.  ? Spicy or fatty foods.  General instructions  · Take over-the-counter and prescription medicines only as told by your doctor.  · Use a cool mist humidifier to add moisture to the air in your home. This can make it easier for you to breathe.  · Cover your mouth and nose when you cough or sneeze.  · Wash your hands with soap and water often, especially after you cough or sneeze. If you cannot use soap and water, use alcohol-based hand .  · Keep all follow-up visits as told by your doctor. This is important.  How is this prevented?    · Get a flu shot every year. You may get the flu shot in late summer, fall, or winter. Ask your doctor when you should get your flu shot.  · Avoid contact with people who are sick during fall and winter (cold and flu season).  Contact a doctor if:  · You get new symptoms.  · You have:  ? Chest pain.  ? Watery poop (diarrhea).  ? A fever.  · Your cough gets worse.  · You start to have more mucus.  · You feel sick to your stomach.  · You throw up.  Get help right away if you:  · Have shortness of breath.  · Have trouble breathing.  · Have skin or nails that turn a bluish color.  · Have very bad pain or stiffness in your neck.  · Get a sudden headache.  · Get sudden pain in your face or ear.  · Cannot eat or drink  "without throwing up.  Summary  · Influenza (\"the flu\") is an infection in the lungs, nose, and throat. It is caused by a virus.  · Take over-the-counter and prescription medicines only as told by your doctor.  · Getting a flu shot every year is the best way to avoid getting the flu.  This information is not intended to replace advice given to you by your health care provider. Make sure you discuss any questions you have with your health care provider.  Document Released: 09/26/2009 Document Revised: 06/05/2019 Document Reviewed: 06/05/2019  [employer] Interactive Patient Education © 2019 [employer]      Pregnancy and Influenza    Influenza, also called the flu, is an infection of the lungs and airways (respiratory tract). If you are pregnant, you are more likely to catch the flu. You are also more likely to have a more serious case of the flu. This is because pregnancy causes changes to your body’s disease-fighting system (immune system), heart, and lungs. If you develop a bad case of the flu, especially with a high fever, this can cause problems for you and your developing baby.  How do people get the flu?  The flu is caused by a type of germ called a virus. It spreads when virus particles get passed from person to person by:  · Being near a sick person who is coughing or sneezing.  · Touching something that has the virus on it and then touching your mouth, nose, or face.  The influenza virus is most common during the fall and winter.  How can I protect myself against the flu?  · Get a flu shot. The best way to prevent the flu is to get a flu shot before flu season starts. The flu shot is not dangerous for your developing baby. It may even help protect your baby from the flu for up to 6 months after birth.  · Wash your hands often with soap and warm water. If soap and water are not available, use hand .  · Do not come in close contact with sick people.  · Do not share food, drinks, or utensils with other " people.  · Avoid touching your eyes, nose, and mouth.  · Clean frequently used surfaces at home, school, or work.  · Practice healthy lifestyle habits, such as:  ? Eating a healthy, balanced diet.  ? Drinking plenty of fluids.  ? Exercising regularly or as told by your health care provider.  ? Sleeping 7-9 hours each night.  ? Finding ways to manage stress.  What should I do if I have flu symptoms?  · If you have any symptoms of the flu, even after getting a flu shot, contact your health care provider right away.  · To reduce fever, take over-the-counter acetaminophen as told by your health care provider.  · If you have the flu, you may get antiviral medicine to keep the flu from becoming severe and to shorten how long it lasts.  · Avoid spreading the flu to others:  ? Stay home until you are well.  ? Cover your nose and mouth when you cough or sneeze.  ? Wash your hands often.  Follow these instructions at home:  · Take over-the-counter and prescription medicines only as told by your health care provider. Do not take any medicine, including cold or flu medicine, unless your health care provider tells you to do so.  · If you were prescribed antiviral medicine, take it as told by your health care provider. Do not stop taking the antiviral medicine even if you start to feel better.  · Eat a nutrient-rich diet that includes fresh fruits and vegetables, whole grains, lean protein, and low-fat dairy.  · Drink enough fluid to keep your urine clear or pale yellow.  · Get plenty of rest.  Contact a health care provider if:  · You have fever or chills.  · You have a cough, sore throat, or stuffy nose.  · You have worsening or unusual:  ? Muscle aches.  ? Headache.  ? Tiredness.  ? Loss of appetite.  · You have vomiting or diarrhea.  Get help right away if:  · You have trouble breathing.  · You have chest pain.  · You have abdominal pain.  · You begin to have labor pains.  · You have a fever that does not go down 24 hours  after you take medicine.  · You do not feel your baby move.  · You have diarrhea or vomiting that will not go away.  · You have dizziness or confusion.  · Your symptoms do not improve, even with treatment.  Summary  · If you are pregnant, you are more likely to catch the flu. You are also more likely to have a more serious case of the flu.  · If you have flu-like symptoms, call your health care provider right away. If you develop a bad case of the flu, especially with a high fever, this can be dangerous for your developing baby.  · The best way to prevent the flu is to get a flu shot before flu season starts. The flu shot is not dangerous for your developing baby.  · If you have the flu and were prescribed antiviral medicine, take it as told by your health care provider.  This information is not intended to replace advice given to you by your health care provider. Make sure you discuss any questions you have with your health care provider.  Document Released: 10/20/2009 Document Revised: 02/13/2018 Document Reviewed: 02/13/2018  [employer] Interactive Patient Education © 2019 [employer]  Return to ER if symptoms worsen  Increase oral fluids  Tylenol as needed for fever

## 2020-01-04 NOTE — ED PROVIDER NOTES
Subjective   Patient is a 24-year-old white female presents the emergency department with cough, sore throat, fever, generalized body aches that started 2 days ago.  Patient has had intermittent nausea without vomiting.  She states she has had decreased appetite as well.  She states multiple family members have had upper respiratory infections recently but no one was tested for influenza.  She states she has felt much worse today.      History provided by:  Patient   used: No        Review of Systems   Constitutional: Negative.    HENT: Negative.    Eyes: Negative.    Respiratory:        Patient is a 24-year-old white female presents the emergency department with cough, sore throat, fever, generalized body aches that started 2 days ago.  Patient has had intermittent nausea without vomiting.  She states she has had decreased appetite as well.  She states multiple family members have had upper respiratory infections recently but no one was tested for influenza.  She states she has felt much worse today.     Cardiovascular: Negative.    Gastrointestinal: Negative.    Endocrine: Negative.    Genitourinary: Negative.    Musculoskeletal: Negative.    Skin: Negative.    Allergic/Immunologic: Negative.    Neurological: Negative.    Hematological: Negative.    Psychiatric/Behavioral: Negative.    All other systems reviewed and are negative.      Past Medical History:   Diagnosis Date   • Back pain    • Bipolar disorder (CMS/HCC)    • Depression        No Known Allergies    Past Surgical History:   Procedure Laterality Date   • EAR TUBES Bilateral        History reviewed. No pertinent family history.    Social History     Socioeconomic History   • Marital status:      Spouse name: Not on file   • Number of children: Not on file   • Years of education: Not on file   • Highest education level: Not on file   Tobacco Use   • Smoking status: Current Every Day Smoker     Packs/day: 0.50     Years: 3.00     " Pack years: 1.50     Types: Cigarettes   • Smokeless tobacco: Never Used   Substance and Sexual Activity   • Alcohol use: Yes     Comment: occasional   • Drug use: No   • Sexual activity: Defer       Prior to Admission medications    Medication Sig Start Date End Date Taking? Authorizing Provider   ARIPiprazole (ABILIFY PO) Take  by mouth.    Provider, MD St[facility]   azithromycin (ZITHROMAX Z-JESSICA) 250 MG tablet Take 2 tablets the first day, then 1 tablet daily for 4 days. 2/27/17   Lisa Pinzon APRN   escitalopram (LEXAPRO) 20 MG tablet Take 20 mg by mouth Daily.    Provider, MD [facility]   LamoTRIgine (LAMICTAL PO) Take  by mouth.    Provider, MD [facility]   Prenatal Vit-Fe Fumarate-FA (PRENATAL, CLASSIC, VITAMIN) 28-0.8 MG tablet tablet Take  by mouth Daily.    ProviderStacia MD   traMADol (ULTRAM) 50 MG tablet Take 1 tablet by mouth Every 8 (Eight) Hours As Needed for moderate pain (4-6). 2/12/17   Tina Fuller DO       /73 (BP Location: Left arm, Patient Position: Sitting)   Pulse 98   Temp 98.8 °F (37.1 °C) (Oral)   Resp 18   Ht 157.5 cm (62\")   Wt 84.4 kg (186 lb)   LMP 11/25/2019 Comment: pt report she is 2 weeks late   SpO2 99%   BMI 34.02 kg/m²     Objective   Physical Exam   Constitutional: She is oriented to person, place, and time. She appears well-developed and well-nourished.   HENT:   Head: Normocephalic and atraumatic.   Pharynx is slightly erythematous without exudate.  There is clear postnasal drainage noted.   Eyes: Pupils are equal, round, and reactive to light. Conjunctivae and EOM are normal.   Neck: Normal range of motion. Neck supple. No tracheal deviation present. No thyromegaly present.   Cardiovascular: Normal rate, regular rhythm, normal heart sounds and intact distal pulses.   Pulmonary/Chest: Effort normal. No respiratory distress. She has no wheezes. She has no rales. She exhibits no tenderness.   Expiratory wheezing noted throughout    Abdominal: " Soft. Bowel sounds are normal.   Musculoskeletal: Normal range of motion.   Neurological: She is alert and oriented to person, place, and time. She has normal reflexes. No cranial nerve deficit.   Skin: Skin is warm and dry.   Psychiatric: She has a normal mood and affect. Her behavior is normal. Judgment and thought content normal.   Nursing note and vitals reviewed.      Procedures         Lab Results (last 24 hours)     Procedure Component Value Units Date/Time    Influenza Antigen, Rapid - Swab, Nasopharynx [[id number]]  (Abnormal) Collected:  01/03/20 2041    Specimen:  Swab from Nasopharynx Updated:  01/03/20 2103     Influenza A Ag, EIA Negative     Influenza B Ag, EIA Positive    Narrative:       Recommend confirmation of negative results by viral culture or molecular assay.    Rapid Strep A Screen - Swab, Throat [[id number]]  (Normal) Collected:  01/03/20 2041    Specimen:  Swab from Throat Updated:  01/03/20 2103     Strep A Ag Negative    Beta Strep Culture, Throat - Swab, Throat [[id number]] Collected:  01/03/20 2041    Specimen:  Swab from Throat Updated:  01/03/20 2102    POC Pregnancy, Urine [[id number]]  (Abnormal) Collected:  01/03/20 2047    Specimen:  Urine Updated:  01/03/20 2048     HCG, Urine, QL Positive     Lot Number \[id number]     Internal Positive Control Positive     Internal Negative Control Negative          No orders to display       ED Course  ED Course as of Jan 03 2230 Fri Jan 03, 2020 2139 The results of testing with patient.  Advised the patient that she was positive for influenza B.  Also advised that she did not get a chest x-ray because her urine hCG was positive.  Patient states that her last menstrual period was November 25.  She will follow-up with OB/GYN.pt will be discharged home shortly in stable condition. Advised to follow up with pcp on Monday- advised to return if symptoms worsen     [CW]      ED Course User Index  [CW] Elsa Monge APRN          [facility]  Number  of Diagnoses or Management Options  Influenza B: minor  Pregnancy, unspecified gestational age: minor  Upper respiratory tract infection, unspecified type: minor     Amount and/or Complexity of Data Reviewed  Clinical lab tests: ordered and reviewed    Patient Progress  Patient progress: stable      Final diagnoses:   Influenza B   Pregnancy, unspecified gestational age   Upper respiratory tract infection, unspecified type          Elsa Monge, APRN  01/03/20 [id number]

## 2020-01-05 LAB — BACTERIA SPEC AEROBE CULT: NORMAL

## 2020-01-09 ENCOUNTER — TRANSCRIBE ORDERS (OUTPATIENT)
Dept: ADMINISTRATIVE | Facility: HOSPITAL | Age: 25
End: 2020-01-09

## 2020-01-09 ENCOUNTER — LAB (OUTPATIENT)
Dept: LAB | Facility: HOSPITAL | Age: 25
End: 2020-01-09

## 2020-01-09 DIAGNOSIS — O26.859 SPOTTING DURING PREGNANCY: Primary | ICD-10-CM

## 2020-01-09 LAB
ABO GROUP BLD: NORMAL
BLD GP AB SCN SERPL QL: NEGATIVE
RH BLD: POSITIVE

## 2020-01-09 PROCEDURE — 86850 RBC ANTIBODY SCREEN: CPT | Performed by: OBSTETRICS & GYNECOLOGY

## 2020-01-09 PROCEDURE — 86900 BLOOD TYPING SEROLOGIC ABO: CPT | Performed by: OBSTETRICS & GYNECOLOGY

## 2020-01-09 PROCEDURE — 36415 COLL VENOUS BLD VENIPUNCTURE: CPT

## 2020-01-09 PROCEDURE — 86901 BLOOD TYPING SEROLOGIC RH(D): CPT | Performed by: OBSTETRICS & GYNECOLOGY

## 2020-01-13 LAB
EXTERNAL HEPATITIS B SURFACE ANTIGEN: NEGATIVE
EXTERNAL HERPES PCR: NEGATIVE
EXTERNAL RUBELLA QUALITATIVE: NORMAL
EXTERNAL SYPHILIS RPR SCREEN: NEGATIVE
HIV1 P24 AG SERPL QL IA: NEGATIVE

## 2020-02-20 ENCOUNTER — APPOINTMENT (OUTPATIENT)
Dept: ULTRASOUND IMAGING | Facility: HOSPITAL | Age: 25
End: 2020-02-20

## 2020-02-20 ENCOUNTER — HOSPITAL ENCOUNTER (EMERGENCY)
Facility: HOSPITAL | Age: 25
Discharge: HOME | End: 2020-02-20
Admitting: INTERNAL MEDICINE

## 2020-02-20 VITALS
DIASTOLIC BLOOD PRESSURE: 87 MMHG | OXYGEN SATURATION: 100 % | RESPIRATION RATE: 16 BRPM | WEIGHT: 188 LBS | TEMPERATURE: 98 F | HEIGHT: 62 IN | HEART RATE: 74 BPM | SYSTOLIC BLOOD PRESSURE: 132 MMHG | BODY MASS INDEX: 34.6 KG/M2

## 2020-02-20 DIAGNOSIS — R10.9 ABDOMINAL CRAMPING: ICD-10-CM

## 2020-02-20 DIAGNOSIS — Z3A.12 12 WEEKS GESTATION OF PREGNANCY: Primary | ICD-10-CM

## 2020-02-20 LAB
ABO GROUP BLD: NORMAL
ALBUMIN SERPL-MCNC: 3.9 G/DL (ref 3.5–5.2)
ALBUMIN/GLOB SERPL: 1.3 G/DL
ALP SERPL-CCNC: 57 U/L (ref 39–117)
ALT SERPL W P-5'-P-CCNC: 8 U/L (ref 1–33)
ANION GAP SERPL CALCULATED.3IONS-SCNC: 14 MMOL/L (ref 5–15)
AST SERPL-CCNC: 15 U/L (ref 1–32)
BACTERIA UR QL AUTO: ABNORMAL /HPF
BASOPHILS # BLD AUTO: 0.05 10*3/MM3 (ref 0–0.2)
BASOPHILS NFR BLD AUTO: 0.4 % (ref 0–1.5)
BILIRUB SERPL-MCNC: <0.2 MG/DL (ref 0.2–1.2)
BILIRUB UR QL STRIP: NEGATIVE
BLD GP AB SCN SERPL QL: NEGATIVE
BUN BLD-MCNC: 8 MG/DL (ref 6–20)
BUN/CREAT SERPL: 27.6 (ref 7–25)
CALCIUM SPEC-SCNC: 8.9 MG/DL (ref 8.6–10.5)
CHLORIDE SERPL-SCNC: 105 MMOL/L (ref 98–107)
CLARITY UR: CLEAR
CO2 SERPL-SCNC: 21 MMOL/L (ref 22–29)
COLOR UR: ABNORMAL
CREAT BLD-MCNC: 0.29 MG/DL (ref 0.57–1)
DEPRECATED RDW RBC AUTO: 41.6 FL (ref 37–54)
EOSINOPHIL # BLD AUTO: 0.46 10*3/MM3 (ref 0–0.4)
EOSINOPHIL NFR BLD AUTO: 3.9 % (ref 0.3–6.2)
ERYTHROCYTE [DISTWIDTH] IN BLOOD BY AUTOMATED COUNT: 14.1 % (ref 12.3–15.4)
GFR SERPL CREATININE-BSD FRML MDRD: >150 ML/MIN/1.73
GLOBULIN UR ELPH-MCNC: 3.1 GM/DL
GLUCOSE BLD-MCNC: 91 MG/DL (ref 65–99)
GLUCOSE UR STRIP-MCNC: NEGATIVE MG/DL
HCG INTACT+B SERPL-ACNC: NORMAL MIU/ML
HCT VFR BLD AUTO: 36.9 % (ref 34–46.6)
HGB BLD-MCNC: 13 G/DL (ref 12–15.9)
HGB UR QL STRIP.AUTO: ABNORMAL
HYALINE CASTS UR QL AUTO: ABNORMAL /LPF
IMM GRANULOCYTES # BLD AUTO: 0.1 10*3/MM3 (ref 0–0.05)
IMM GRANULOCYTES NFR BLD AUTO: 0.8 % (ref 0–0.5)
KETONES UR QL STRIP: NEGATIVE
LEUKOCYTE ESTERASE UR QL STRIP.AUTO: NEGATIVE
LYMPHOCYTES # BLD AUTO: 2.14 10*3/MM3 (ref 0.7–3.1)
LYMPHOCYTES NFR BLD AUTO: 18 % (ref 19.6–45.3)
MCH RBC QN AUTO: 29 PG (ref 26.6–33)
MCHC RBC AUTO-ENTMCNC: 35.2 G/DL (ref 31.5–35.7)
MCV RBC AUTO: 82.4 FL (ref 79–97)
MONOCYTES # BLD AUTO: 0.94 10*3/MM3 (ref 0.1–0.9)
MONOCYTES NFR BLD AUTO: 7.9 % (ref 5–12)
NEUTROPHILS # BLD AUTO: 8.21 10*3/MM3 (ref 1.7–7)
NEUTROPHILS NFR BLD AUTO: 69 % (ref 42.7–76)
NITRITE UR QL STRIP: NEGATIVE
NRBC BLD AUTO-RTO: 0 /100 WBC (ref 0–0.2)
NUMBER OF DOSES: NORMAL
PH UR STRIP.AUTO: 5.5 [PH] (ref 5–8)
PLATELET # BLD AUTO: 297 10*3/MM3 (ref 140–450)
PMV BLD AUTO: 10.1 FL (ref 6–12)
POTASSIUM BLD-SCNC: 3.7 MMOL/L (ref 3.5–5.2)
PROT SERPL-MCNC: 7 G/DL (ref 6–8.5)
PROT UR QL STRIP: NEGATIVE
RBC # BLD AUTO: 4.48 10*6/MM3 (ref 3.77–5.28)
RBC # UR: ABNORMAL /HPF
REF LAB TEST METHOD: ABNORMAL
RH BLD: POSITIVE
SODIUM BLD-SCNC: 140 MMOL/L (ref 136–145)
SP GR UR STRIP: 1.03 (ref 1–1.03)
SQUAMOUS #/AREA URNS HPF: ABNORMAL /HPF
UROBILINOGEN UR QL STRIP: ABNORMAL
WBC NRBC COR # BLD: 11.9 10*3/MM3 (ref 3.4–10.8)
WBC UR QL AUTO: ABNORMAL /HPF

## 2020-02-20 PROCEDURE — 85025 COMPLETE CBC W/AUTO DIFF WBC: CPT | Performed by: PHYSICIAN ASSISTANT

## 2020-02-20 PROCEDURE — 86901 BLOOD TYPING SEROLOGIC RH(D): CPT | Performed by: PHYSICIAN ASSISTANT

## 2020-02-20 PROCEDURE — 96374 THER/PROPH/DIAG INJ IV PUSH: CPT

## 2020-02-20 PROCEDURE — 87086 URINE CULTURE/COLONY COUNT: CPT | Performed by: PHYSICIAN ASSISTANT

## 2020-02-20 PROCEDURE — 25010000002 ONDANSETRON PER 1 MG: Performed by: PHYSICIAN ASSISTANT

## 2020-02-20 PROCEDURE — 80053 COMPREHEN METABOLIC PANEL: CPT | Performed by: PHYSICIAN ASSISTANT

## 2020-02-20 PROCEDURE — 76801 OB US < 14 WKS SINGLE FETUS: CPT

## 2020-02-20 PROCEDURE — 86850 RBC ANTIBODY SCREEN: CPT | Performed by: PHYSICIAN ASSISTANT

## 2020-02-20 PROCEDURE — 86900 BLOOD TYPING SEROLOGIC ABO: CPT | Performed by: PHYSICIAN ASSISTANT

## 2020-02-20 PROCEDURE — 81001 URINALYSIS AUTO W/SCOPE: CPT | Performed by: PHYSICIAN ASSISTANT

## 2020-02-20 PROCEDURE — 99283 EMERGENCY DEPT VISIT LOW MDM: CPT

## 2020-02-20 PROCEDURE — 84702 CHORIONIC GONADOTROPIN TEST: CPT | Performed by: PHYSICIAN ASSISTANT

## 2020-02-20 RX ORDER — SODIUM CHLORIDE 0.9 % (FLUSH) 0.9 %
10 SYRINGE (ML) INJECTION AS NEEDED
Status: DISCONTINUED | OUTPATIENT
Start: 2020-02-20 | End: 2020-02-21 | Stop reason: HOSPADM

## 2020-02-20 RX ORDER — ONDANSETRON 2 MG/ML
4 INJECTION INTRAMUSCULAR; INTRAVENOUS ONCE
Status: COMPLETED | OUTPATIENT
Start: 2020-02-20 | End: 2020-02-20

## 2020-02-20 RX ORDER — PROMETHAZINE HYDROCHLORIDE 25 MG/1
25 TABLET ORAL EVERY 6 HOURS PRN
COMMUNITY

## 2020-02-20 RX ORDER — PRENATAL VIT NO.126/IRON/FOLIC 28MG-0.8MG
1 TABLET ORAL DAILY
COMMUNITY

## 2020-02-20 RX ORDER — ONDANSETRON 4 MG/1
4 TABLET, ORALLY DISINTEGRATING ORAL EVERY 8 HOURS PRN
COMMUNITY

## 2020-02-20 RX ADMIN — ONDANSETRON HYDROCHLORIDE 4 MG: 2 SOLUTION INTRAMUSCULAR; INTRAVENOUS at 22:18

## 2020-02-21 LAB
EXTERNAL CHLAMYDIA SCREEN: NEGATIVE
EXTERNAL GONORRHEA SCREEN: NEGATIVE

## 2020-02-21 NOTE — ED PROVIDER NOTES
"Subjective   History of Present Illness    Patient is a 24-year-old female presenting to ED with abdominal cramping.  Patient reported she is approximately 12 weeks pregnant.  Patient is G1, .  Patient reported for this pregnancy she is following up with Dr. Ding and has had 2 ultrasounds confirming IUP.  Patient stated yesterday she did a significant amount of yard work and was lifting \"things I know are too heavy for me to lift.\"  Patient reported since that time she has had lower abdominal cramping.  Patient denies any blunt trauma or abdomen injuries, fevers, chills, diaphoresis, nausea, vomiting, vaginal bleeding, vaginal spotting, hematuria, dysuria, flank pain, abnormal vaginal discharge, URI symptoms, or any other recent illnesses.  Patient reported she is very concerned because the bilateral lower cramping is happening to the lateral aspects of her bilateral lower quadrants and she has not felt pain like this during her pregnancy.  Patient reported she did have a 1 month period of vaginal bleeding which was followed by Dr. Ding with no concerns and resolution of those symptoms for over a month now.    Records reviewed show patient was seen in ED on 1/3/2020 at which time she was diagnosed with influenza B and had her first positive pregnancy test.    Review of Systems   Constitutional: Negative.  Negative for chills, diaphoresis and fever.   HENT: Negative.  Negative for congestion, sinus pressure and sore throat.    Respiratory: Negative.  Negative for cough, chest tightness and shortness of breath.    Cardiovascular: Negative.  Negative for chest pain.   Gastrointestinal: Positive for abdominal pain (cramping to bilateral lower quadrants). Negative for nausea and vomiting.   Genitourinary: Negative.  Negative for dysuria, flank pain, hematuria, pelvic pain, vaginal bleeding and vaginal discharge.        Reports + pregnancy (12 weeks)   Musculoskeletal: Negative.  Negative for arthralgias and " myalgias.   Skin: Negative.  Negative for rash and wound.   Neurological: Negative.  Negative for dizziness, syncope, weakness and headaches.   Psychiatric/Behavioral: Negative.    All other systems reviewed and are negative.      Past Medical History:   Diagnosis Date   • Back pain    • Bipolar disorder (CMS/HCC)    • Depression        No Known Allergies    Past Surgical History:   Procedure Laterality Date   • EAR TUBES Bilateral        History reviewed. No pertinent family history.    Social History     Socioeconomic History   • Marital status:      Spouse name: Not on file   • Number of children: Not on file   • Years of education: Not on file   • Highest education level: Not on file   Tobacco Use   • Smoking status: Current Every Day Smoker     Packs/day: 0.50     Years: 3.00     Pack years: 1.50     Types: Cigarettes   • Smokeless tobacco: Never Used   Substance and Sexual Activity   • Alcohol use: Never     Frequency: Never   • Drug use: No   • Sexual activity: Defer           Objective   Physical Exam   Constitutional: She is oriented to person, place, and time. She appears well-developed and well-nourished. She is cooperative. No distress.   HENT:   Head: Normocephalic and atraumatic.   Right Ear: External ear normal.   Left Ear: External ear normal.   Mouth/Throat: Uvula is midline, oropharynx is clear and moist and mucous membranes are normal.   Eyes: Pupils are equal, round, and reactive to light. Conjunctivae, EOM and lids are normal. Right conjunctiva is not injected. Left conjunctiva is not injected.   Neck: Normal range of motion. Neck supple.   Cardiovascular: Normal rate, regular rhythm, normal heart sounds, intact distal pulses and normal pulses.   No murmur heard.  Pulses:       Radial pulses are 2+ on the right side, and 2+ on the left side.        Dorsalis pedis pulses are 2+ on the right side, and 2+ on the left side.        Posterior tibial pulses are 2+ on the right side, and 2+ on  the left side.   Pulmonary/Chest: Effort normal and breath sounds normal. No respiratory distress. She has no decreased breath sounds. She has no wheezes. She has no rhonchi. She has no rales. She exhibits no bony tenderness.   Abdominal: Soft. Normal appearance and bowel sounds are normal. There is no tenderness. There is no guarding and no CVA tenderness.   Musculoskeletal:        Cervical back: Normal.        Thoracic back: Normal.        Lumbar back: Normal.   Lymphadenopathy:     She has no cervical adenopathy.   Neurological: She is alert and oriented to person, place, and time. She has normal strength. Gait normal.   Skin: Skin is warm, dry and intact. Capillary refill takes less than 2 seconds. No rash noted. She is not diaphoretic.   Psychiatric: She has a normal mood and affect. Her speech is normal and behavior is normal. Thought content normal.   Nursing note and vitals reviewed.      Procedures           ED Course  ED Course as of  025   Thu  Urinalysis with evidence of 1+ bacteria, 0-2 white blood cells, 3-6 squamous epithelium, trace blood.  Remainder of labs and ultrasound pending at this time.    [JS]    Leukocytosis of 11.9.  CBC otherwise unremarkable.  CMP, quantitative level,  Rh evaluation, and ultrasound pending.    [JS]   2235 Patient made aware of O+ blood type.  hCG quant 33,511.  Ultrasound finds first trimester IUP of 12 weeks 3 days with no abnormalities.    [JS]   2245 Upon reevaluation patient resting comfortably in bed with no complaints or symptoms at this time.  Reviewed with patient lab results, urinalysis results, and ultrasound findings.  Offered patient a pelvic examination at this time for complete and thorough examination and patient declines reporting she would rather do this at her OB/GYN providers.  Discussed with patient importance of continued prenatal vitamin use, continued OB/GYN follow-up and need to contact her OB/GYN provider to  make them aware of her visit here today.  Discussed need for PCP follow-up in the next 48 hours to assure improvement if not resolution of symptoms.  Discussed with patient round ligament pain during pregnancy.  Discussed strict return precautions and need for immediate return to ED should patient develop any new or worsening symptoms.  Patient without any further questions, concerns, or needs at this time and is stable for discharge.    [JS]      ED Course User Index  [JS] Pérez Freitas PA-C                                   EMR Dragon/transcription disclaimer: Much of this encounter note was created utilizing an electronic transcription/translation of spoken language to printed text.  Electronic translation of spoken language may permit erroneous, or at times, nonsensical words or phrases to be in advertently transcribed; although I have reviewed the note for such errors to the best of my ability, some may still exist.          MDM  Number of Diagnoses or Management Options  12 weeks gestation of pregnancy:   Abdominal cramping:      Amount and/or Complexity of Data Reviewed  Clinical lab tests: ordered and reviewed  Tests in the radiology section of CPT®: ordered and reviewed  Decide to obtain previous medical records or to obtain history from someone other than the patient: yes  Review and summarize past medical records: yes    Patient Progress  Patient progress: improved      Final diagnoses:   12 weeks gestation of pregnancy   Abdominal cramping            Pérez Freitas PA-C  02/21/20 0255

## 2020-02-21 NOTE — DISCHARGE INSTRUCTIONS
Today you were noted to have O postive blood type.  Your beta-hCG quantitative level is 33,511.  Your ultrasound shows that you are in the first trimester approximately 12 weeks 3 days.  Please contact Dr. Ding's office in the morning to make them aware that you were here and see when to schedule your next follow-up appointment.  As we discussed please read below for further information regarding round ligament pain.      Abdominal Pain During Pregnancy    Belly (abdominal) pain is common during pregnancy. There are many possible causes. Most of the time, it is not a serious problem. Other times, it can be a sign that something is wrong with the pregnancy. Always tell your doctor if you have belly pain.  Follow these instructions at home:  · Do not have sex or put anything in your vagina until your pain goes away completely.  · Get plenty of rest until your pain gets better.  · Drink enough fluid to keep your pee (urine) pale yellow.  · Take over-the-counter and prescription medicines only as told by your doctor.  · Keep all follow-up visits as told by your doctor. This is important.  Contact a doctor if:  · Your pain continues or gets worse after resting.  · You have lower belly pain that:  ? Comes and goes at regular times.  ? Spreads to your back.  ? Feels like menstrual cramps.  · You have pain or burning when you pee (urinate).  Get help right away if:  · You have a fever or chills.  · You have vaginal bleeding.  · You are leaking fluid from your vagina.  · You are passing tissue from your vagina.  · You throw up (vomit) for more than 24 hours.  · You have watery poop (diarrhea) for more than 24 hours.  · Your baby is moving less than usual.  · You feel very weak or faint.  · You have shortness of breath.  · You have very bad pain in your upper belly.  Summary  · Belly (abdominal) pain is common during pregnancy. There are many possible causes.  · If you have belly pain during pregnancy, tell your doctor  right away.  · Keep all follow-up visits as told by your doctor. This is important.  This information is not intended to replace advice given to you by your health care provider. Make sure you discuss any questions you have with your health care provider.  Document Released: 12/06/2010 Document Revised: 03/22/2018 Document Reviewed: 03/22/2018  [employer] Patient Education © 2020 [employer]    Round Ligament Pain    The round ligament is a cord of muscle and tissue that helps support the uterus. It can become a source of pain during pregnancy if it becomes stretched or twisted as the baby grows. The pain usually begins in the second trimester (13-28 weeks) of pregnancy, and it can come and go until the baby is delivered. It is not a serious problem, and it does not cause harm to the baby.  Round ligament pain is usually a short, sharp, and pinching pain, but it can also be a dull, lingering, and aching pain. The pain is felt in the lower side of the abdomen or in the groin. It usually starts deep in the groin and moves up to the outside of the hip area. The pain may occur when you:  · Suddenly change position, such as quickly going from a sitting to standing position.  · Roll over in bed.  · Cough or sneeze.  · Do physical activity.  Follow these instructions at home:    · Watch your condition for any changes.  · When the pain starts, relax. Then try any of these methods to help with the pain:  ? Sitting down.  ? Flexing your knees up to your abdomen.  ? Lying on your side with one pillow under your abdomen and another pillow between your legs.  ? Sitting in a warm bath for 15-20 minutes or until the pain goes away.  · Take over-the-counter and prescription medicines only as told by your health care provider.  · Move slowly when you sit down or stand up.  · Avoid long walks if they cause pain.  · Stop or reduce your physical activities if they cause pain.  · Keep all follow-up visits as told by your  health care provider. This is important.  Contact a health care provider if:  · Your pain does not go away with treatment.  · You feel pain in your back that you did not have before.  · Your medicine is not helping.  Get help right away if:  · You have a fever or chills.  · You develop uterine contractions.  · You have vaginal bleeding.  · You have nausea or vomiting.  · You have diarrhea.  · You have pain when you urinate.  Summary  · Round ligament pain is felt in the lower abdomen or groin. It is usually a short, sharp, and pinching pain. It can also be a dull, lingering, and aching pain.  · This pain usually begins in the second trimester (13-28 weeks). It occurs because the uterus is stretching with the growing baby, and it is not harmful to the baby.  · You may notice the pain when you suddenly change position, when you cough or sneeze, or during physical activity.  · Relaxing, flexing your knees to your abdomen, lying on one side, or taking a warm bath may help to get rid of the pain.  · Get help from your health care provider if the pain does not go away or if you have vaginal bleeding, nausea, vomiting, diarrhea, or painful urination.  This information is not intended to replace advice given to you by your health care provider. Make sure you discuss any questions you have with your health care provider.  Document Released: 09/26/2009 Document Revised: 06/05/2019 Document Reviewed: 06/05/2019  [employer] Interactive Patient Education © 2020 [employer]

## 2020-02-22 LAB — BACTERIA SPEC AEROBE CULT: NO GROWTH

## 2020-05-01 ENCOUNTER — HOSPITAL ENCOUNTER (INPATIENT)
Facility: HOSPITAL | Age: 25
LOS: 1 days | Discharge: ANOTHER HOSPITAL | End: 2020-05-02
Attending: OBSTETRICS & GYNECOLOGY | Admitting: OBSTETRICS & GYNECOLOGY

## 2020-05-01 PROBLEM — O60.00 PRETERM LABOR: Status: ACTIVE | Noted: 2020-05-01

## 2020-05-01 PROBLEM — Z34.90 PREGNANCY: Status: ACTIVE | Noted: 2020-05-01

## 2020-05-01 LAB
BACTERIA UR QL AUTO: ABNORMAL /HPF
BILIRUB UR QL STRIP: NEGATIVE
CLARITY UR: CLEAR
CLUE CELLS SPEC QL WET PREP: ABNORMAL
COD CRY URNS QL: ABNORMAL /HPF
COLOR UR: ABNORMAL
GLUCOSE UR STRIP-MCNC: NEGATIVE MG/DL
HGB UR QL STRIP.AUTO: ABNORMAL
HYALINE CASTS UR QL AUTO: ABNORMAL /LPF
HYDATID CYST SPEC WET PREP: ABNORMAL
KETONES UR QL STRIP: ABNORMAL
LEUKOCYTE ESTERASE UR QL STRIP.AUTO: ABNORMAL
NITRITE UR QL STRIP: NEGATIVE
PH UR STRIP.AUTO: 6 [PH] (ref 5–8)
PROT UR QL STRIP: NEGATIVE
RBC # UR: ABNORMAL /HPF
REF LAB TEST METHOD: ABNORMAL
SP GR UR STRIP: 1.03 (ref 1–1.03)
SQUAMOUS #/AREA URNS HPF: ABNORMAL /HPF
T VAGINALIS SPEC QL WET PREP: ABNORMAL
UROBILINOGEN UR QL STRIP: ABNORMAL
WBC SPEC QL WET PREP: ABNORMAL
WBC UR QL AUTO: ABNORMAL /HPF
YEAST GENITAL QL WET PREP: ABNORMAL

## 2020-05-01 PROCEDURE — 25010000003 MAGNESIUM SULFATE 20 GM/500ML SOLUTION: Performed by: OBSTETRICS & GYNECOLOGY

## 2020-05-01 PROCEDURE — 87210 SMEAR WET MOUNT SALINE/INK: CPT | Performed by: OBSTETRICS & GYNECOLOGY

## 2020-05-01 PROCEDURE — 25010000002 TERBUTALINE PER 1 MG: Performed by: OBSTETRICS & GYNECOLOGY

## 2020-05-01 PROCEDURE — 81001 URINALYSIS AUTO W/SCOPE: CPT | Performed by: OBSTETRICS & GYNECOLOGY

## 2020-05-01 PROCEDURE — 87086 URINE CULTURE/COLONY COUNT: CPT | Performed by: OBSTETRICS & GYNECOLOGY

## 2020-05-01 RX ORDER — FAMOTIDINE 10 MG/ML
20 INJECTION, SOLUTION INTRAVENOUS ONCE
Status: COMPLETED | OUTPATIENT
Start: 2020-05-01 | End: 2020-05-01

## 2020-05-01 RX ORDER — ONDANSETRON 2 MG/ML
4 INJECTION INTRAMUSCULAR; INTRAVENOUS EVERY 6 HOURS PRN
Status: DISCONTINUED | OUTPATIENT
Start: 2020-05-01 | End: 2020-05-02 | Stop reason: HOSPADM

## 2020-05-01 RX ORDER — MAGNESIUM SULFATE HEPTAHYDRATE 40 MG/ML
2 INJECTION, SOLUTION INTRAVENOUS CONTINUOUS
Status: DISCONTINUED | OUTPATIENT
Start: 2020-05-01 | End: 2020-05-02 | Stop reason: HOSPADM

## 2020-05-01 RX ORDER — CALCIUM GLUCONATE 94 MG/ML
1 INJECTION, SOLUTION INTRAVENOUS ONCE AS NEEDED
Status: DISCONTINUED | OUTPATIENT
Start: 2020-05-01 | End: 2020-05-02 | Stop reason: HOSPADM

## 2020-05-01 RX ORDER — ACETAMINOPHEN 325 MG/1
650 TABLET ORAL EVERY 6 HOURS PRN
Status: DISCONTINUED | OUTPATIENT
Start: 2020-05-01 | End: 2020-05-02 | Stop reason: HOSPADM

## 2020-05-01 RX ORDER — NICOTINE 21 MG/24HR
1 PATCH, TRANSDERMAL 24 HOURS TRANSDERMAL
Status: DISCONTINUED | OUTPATIENT
Start: 2020-05-02 | End: 2020-05-02 | Stop reason: HOSPADM

## 2020-05-01 RX ORDER — SODIUM CHLORIDE, SODIUM LACTATE, POTASSIUM CHLORIDE, CALCIUM CHLORIDE 600; 310; 30; 20 MG/100ML; MG/100ML; MG/100ML; MG/100ML
75 INJECTION, SOLUTION INTRAVENOUS CONTINUOUS
Status: DISCONTINUED | OUTPATIENT
Start: 2020-05-01 | End: 2020-05-02 | Stop reason: HOSPADM

## 2020-05-01 RX ORDER — TERBUTALINE SULFATE 1 MG/ML
0.25 INJECTION, SOLUTION SUBCUTANEOUS ONCE
Status: COMPLETED | OUTPATIENT
Start: 2020-05-01 | End: 2020-05-01

## 2020-05-01 RX ADMIN — SODIUM CHLORIDE, POTASSIUM CHLORIDE, SODIUM LACTATE AND CALCIUM CHLORIDE 1000 ML: 600; 310; 30; 20 INJECTION, SOLUTION INTRAVENOUS at 16:25

## 2020-05-01 RX ADMIN — TERBUTALINE SULFATE 0.25 MG: 1 INJECTION SUBCUTANEOUS at 17:24

## 2020-05-01 RX ADMIN — MAGNESIUM SULFATE HEPTAHYDRATE 2 G/HR: 40 INJECTION, SOLUTION INTRAVENOUS at 23:05

## 2020-05-01 RX ADMIN — NICOTINE 1 PATCH: 21 PATCH, EXTENDED RELEASE TRANSDERMAL at 23:27

## 2020-05-01 RX ADMIN — FAMOTIDINE 20 MG: 10 INJECTION INTRAVENOUS at 16:48

## 2020-05-01 RX ADMIN — SODIUM CHLORIDE, POTASSIUM CHLORIDE, SODIUM LACTATE AND CALCIUM CHLORIDE 125 ML/HR: 600; 310; 30; 20 INJECTION, SOLUTION INTRAVENOUS at 17:28

## 2020-05-01 NOTE — NURSING NOTE
Presents to [facility] with c/o lower abdominal pain that radiates to the lower back. Pain is intermittent and has been experiencing for juliana 3 hours.

## 2020-05-02 ENCOUNTER — APPOINTMENT (OUTPATIENT)
Dept: ULTRASOUND IMAGING | Facility: HOSPITAL | Age: 25
End: 2020-05-02

## 2020-05-02 VITALS
WEIGHT: 203 LBS | OXYGEN SATURATION: 99 % | RESPIRATION RATE: 20 BRPM | HEART RATE: 114 BPM | DIASTOLIC BLOOD PRESSURE: 58 MMHG | SYSTOLIC BLOOD PRESSURE: 114 MMHG | TEMPERATURE: 97.9 F | BODY MASS INDEX: 37.36 KG/M2 | HEIGHT: 62 IN

## 2020-05-02 LAB
A1 MICROGLOB PLACENTAL VAG QL: POSITIVE
BACTERIA SPEC AEROBE CULT: NO GROWTH

## 2020-05-02 PROCEDURE — 25010000002 PENICILLIN G POTASSIUM PER 600000 UNITS: Performed by: OBSTETRICS & GYNECOLOGY

## 2020-05-02 PROCEDURE — 84112 EVAL AMNIOTIC FLUID PROTEIN: CPT | Performed by: OBSTETRICS & GYNECOLOGY

## 2020-05-02 PROCEDURE — 76805 OB US >/= 14 WKS SNGL FETUS: CPT

## 2020-05-02 PROCEDURE — 25010000002 AZITHROMYCIN PER 500 MG: Performed by: OBSTETRICS & GYNECOLOGY

## 2020-05-02 PROCEDURE — 25010000003 MAGNESIUM SULFATE 20 GM/500ML SOLUTION: Performed by: OBSTETRICS & GYNECOLOGY

## 2020-05-02 RX ORDER — PENICILLIN G POTASSIUM 5000000 [IU]/1
5 INJECTION, POWDER, FOR SOLUTION INTRAMUSCULAR; INTRAVENOUS ONCE
Status: DISCONTINUED | OUTPATIENT
Start: 2020-05-02 | End: 2020-05-02

## 2020-05-02 RX ORDER — PENICILLIN G 3000000 [IU]/50ML
3 INJECTION, SOLUTION INTRAVENOUS EVERY 4 HOURS
Status: DISCONTINUED | OUTPATIENT
Start: 2020-05-02 | End: 2020-05-02 | Stop reason: HOSPADM

## 2020-05-02 RX ORDER — PENICILLIN G 3000000 [IU]/50ML
3 INJECTION, SOLUTION INTRAVENOUS EVERY 4 HOURS
Status: DISCONTINUED | OUTPATIENT
Start: 2020-05-02 | End: 2020-05-02

## 2020-05-02 RX ADMIN — NICOTINE 1 PATCH: 21 PATCH, EXTENDED RELEASE TRANSDERMAL at 08:23

## 2020-05-02 RX ADMIN — MAGNESIUM SULFATE HEPTAHYDRATE 2 G/HR: 40 INJECTION, SOLUTION INTRAVENOUS at 06:32

## 2020-05-02 RX ADMIN — AZITHROMYCIN MONOHYDRATE 500 MG: 500 INJECTION, POWDER, LYOPHILIZED, FOR SOLUTION INTRAVENOUS at 08:34

## 2020-05-02 RX ADMIN — SODIUM CHLORIDE, POTASSIUM CHLORIDE, SODIUM LACTATE AND CALCIUM CHLORIDE 75 ML/HR: 600; 310; 30; 20 INJECTION, SOLUTION INTRAVENOUS at 01:51

## 2020-05-02 RX ADMIN — SODIUM CHLORIDE 5 MILLION UNITS: 900 INJECTION INTRAVENOUS at 08:12

## 2020-05-02 RX ADMIN — ACETAMINOPHEN 650 MG: 325 TABLET, FILM COATED ORAL at 03:55

## 2020-05-02 NOTE — NURSING NOTE
Pt off the monitor from 2108-2120 to move from a triage room to a regular room for further stay due to Magnesium Therapy

## 2020-05-02 NOTE — PROGRESS NOTES
"[facility]  Obstetric Progress Note    Subjective     Patient:    The patient feels much better.  States has not felt contractions overnight.  However, stated felt like \"fluid was leaking around tube thing\".  Amnisure performed this morning which returned positive.  Bedside ultrasound performed by MD which did show some fluid around baby with footling breech presentation, hourglassing membranes with cervix 2-3cm dilated by ultrasound. Very candid conversation with patient regarding fetal viability      Objective     Vital Signs Range for the last 24 hours  Temp:  [97.9 °F (36.6 °C)-98.8 °F (37.1 °C)] 97.9 °F (36.6 °C)   Temp src: Oral   BP: (111-129)/(51-70) 120/62   Heart Rate:  [] 100   Resp:  [16-20] 16   SpO2:  [91 %-100 %] 95 %       Device (Oxygen Therapy): room air   Weight:  [92.1 kg (203 lb)] 92.1 kg (203 lb)       Flowsheet Rows      First Filed Value   Admission Height  157.5 cm (62\") Documented at 05/01/2020 1545   Admission Weight  92.1 kg (203 lb) Documented at 05/01/2020 1545          Intake/Output last 24 hours:      Intake/Output Summary (Last 24 hours) at 5/2/2020 0805  Last data filed at 5/2/2020 0630  Gross per 24 hour   Intake 3400 ml   Output 1875 ml   Net 1525 ml       Intake/Output this shift:    No intake/output data recorded.    Physical Exam:  General: Patient is in no acute distress   Heart CVS exam: tachycardic    Lungs Chest: no tachypnea, retractions or cyanosis.     Abdomen Abdominal exam: tenderness not noted.   Extremities Exam of extremities: no pedal edema noted     Presentation: Footling breech   Cervix: Exam by: Method: sterile exam per physician   Dilation: Cervical Dilation (cm): 2-3   Effacement:     Station:           Fetal Heart Rate Assessment   Method: Fetal HR Assessment Method: external   Beats/min: Fetal HR (beats/min): 135   Baseline: Fetal Heart Baseline Rate: normal range   Varibility: Fetal HR Variability: moderate (amplitude range 6 to 25 bpm)   Accels: " Fetal HR Accelerations: greater than/equal to 10 bpm (32 wks gest or less), lasts at least 10 seconds (32 wks gest or less)   Decels: Fetal HR Decelerations: variable   Tracing Category: Fetal HR Tracing Category: Category II     Uterine Assessment   Method: Method: palpation, per patient report, external tocotransducer   Frequency (min): Contraction Frequency (Minutes): occasional   Ctx Count in 10 min:     Duration:     Intensity: Contraction Intensity: mild by palpation   Intensity by IUPC:     Resting Tone: Uterine Resting Tone: soft by palpation   Resting Tone by IUPC:     [place] Units:         Assessment/Plan       Pregnancy     labor        Assessment:  1.  Intrauterine pregnancy at 22w5d gestation with equivocal fetal status.    2.   labor  with possible ROM and PPROM  without chorioamnionitis  3.  Obstetrical history significant for is non-contributory.  4.  GBS status: No results found for: STREPGPB    Plan:  1. fetal and uterine monitoring  continuously, tocolysis with magnesium sulfate, IV antibiotics, expectant management and NICU consult  2. Plan of care has been reviewed with patient and spouse  3.  Risks, benefits of treatment plan have been discussed.  4.  All questions have been answered.  5.  NICU consult  6.  Case d/w Dr. Luu, [facility] who has agreed to accept transfer      Brisa Ding MD  2020  08:05

## 2020-05-02 NOTE — NURSING NOTE
Elsa RN from [facility] notified of EFW of 1 lb and ALFREDO of 13. Patient has left unit with [facility].    Nathalie Goddard RN  09:16

## 2020-05-02 NOTE — NURSING NOTE
"Warm blanket out of blanket warmer placed behind back and warm blankets around pt. She states that feels \"much better\".   "

## 2020-05-02 NOTE — NURSING NOTE
Went in to give bedside report w/D. Rupesh BROWNING this am. Pt stated that she felt she was bleeding when she coughed. I asked her if she meant coughing up blood? And she stated no, vaginally. Pulled the sheet back and small amount of bloody fluid noted on pad. Dr. Ding at nurses station - updated. See new orders.

## 2020-05-02 NOTE — NURSING NOTE
Notified MD of inability to continuously trace baby previously and FHT's acquired hourly...pt is now asleep and resting and I was able to place EFM and  baby continuously at this time.

## 2020-05-04 NOTE — PAYOR COMM NOTE
"PLEASE NOTE --NOT LATE NOTIFICATION DUE TO WEEKEND    ADMIT INPT 5-1-20  DC 5-2-20 EMERGENT TRANSFER TO [facility] VIA AVAC    UR  [phone]    Akilah Avila (24 y.o. Female)     Date of Birth Social Security Number Address Home Phone MRN    1995  [address] [address] [phone] [id number]    Mu-ism Marital Status          Rastafarian        Admission Date Admission Type Admitting Provider Attending Provider Department, Room/Bed    5/1/20 Elective Brisa Ding MD  [facility] LABOR DELIVERY, L04/1    Discharge Date Discharge Disposition Discharge Destination        5/2/2020 Transfer to Another Facility              Attending Provider:  (none)   Allergies:  No Known Allergies    Isolation:  None   Infection:  Influenza (01/03/20)   Code Status:  Prior    Ht:  157.5 cm (62\")   Wt:  92.1 kg (203 lb)    Admission Cmt:  None   Principal Problem:  None                Active Insurance as of 5/1/2020     Primary Coverage     Payor Plan Insurance Group Employer/Plan Group    [employer] [place] [employer] [place]      Payor Plan Address Payor Plan Phone Number Payor Plan Fax Number Effective Dates    [address] [address] [phone]  1/3/2020 - None Entered    [facility] [id number]       Subscriber Name Subscriber Birth Date Member ID       AKILAH AVILA 1995 [id number]                 Emergency Contacts      (Rel.) Home Phone Work Phone Mobile Phone    Mojgan Astudillo (Mother) -- -- [phone]               History & Physical      H&P signed by New Onbase, Eastern at 05/04/20 0125      Scan on 5/1/2020 by New Onbase, Eastern: PROGRESS CHARTS, CONTEMPORARY OB/GYN, 05/01/2020          Electronically signed by New Onbase, Eastern at 05/04/20 0125         Facility-Administered Medications as of 5/2/2020   Medication Dose Route Frequency Provider Last Rate Last Dose   • [COMPLETED] famotidine (PEPCID) injection 20 mg  20 mg Intravenous Once Brisa Ding " MD Tami   20 mg at 05/01/20 1648   • [COMPLETED] lactated ringers bolus 1,000 mL  1,000 mL Intravenous Once Brisa Ding MD 2,000 mL/hr at 05/01/20 1625 1,000 mL at 05/01/20 1625   • [COMPLETED] magnesium sulfate bolus from bag 0.04 g/mL 4 g  4 g Intravenous Once Brisa Ding  mL/hr at 05/01/20 2245 4 g at 05/01/20 2245   • [COMPLETED] penicillin g 5 mu/100 mL 0.9% NS IVPB (mbp)  5 Million Units Intravenous Once Brisa Ding MD   5 Million Units at 05/02/20 0812   • [COMPLETED] terbutaline (BRETHINE) injection 0.25 mg  0.25 mg Subcutaneous Once Brisa Ding MD   0.25 mg at 05/01/20 1724     Lab Results (last 48 hours)     Procedure Component Value Units Date/Time    Rubella Antibody, IgG [[id number]] Resulted:  01/13/20     Specimen:  Blood Updated:  05/02/20 0755     External Rubella Qual Immune    HIV-1 Antibody, EIA [[id number]] Resulted:  01/13/20     Specimen:  Blood Updated:  05/02/20 0755     External HIV Antibody Negative    HSV 1/2, PCR - , [[id number]] Resulted:  01/13/20      Updated:  05/02/20 0755     Herpes PCR negative    Chlamydia trachomatis, Neisseria gonorrhoeae, PCR w/ confirmation - Swab, Vagina [[id number]] Resulted:  02/21/20     Specimen:  Swab from Vagina Updated:  05/02/20 0755     External Chlamydia Screen Negative    Gonorrhea Screen - Swab, [[id number]] Resulted:  02/21/20     Specimen:  Swab Updated:  05/02/20 0755     External Gonorrhea Screen Negative    Hepatitis B Surface Antigen [[id number]] Resulted:  01/13/20     Specimen:  Blood Updated:  05/02/20 0755     External Hepatitis B Surface Ag Negative    RPR [[id number]] Resulted:  01/13/20     Specimen:  Blood Updated:  05/02/20 0755     External RPR Negative    PAMG-1, Rapid Assay For ROM - Amniotic Fluid, [[id number]]  (Abnormal) Collected:  05/02/20 0731    Specimen:  Amniotic Fluid Updated:  05/02/20 0748     Rupture of Membranes Positive    Urinalysis, Microscopic Only - Urine,  Catheter In/Out [[id number]]  (Abnormal) Collected:  05/01/20 1627    Specimen:  Urine, Catheter In/Out Updated:  05/01/20 1703     RBC, UA 3-5 /HPF      WBC, UA 0-2 /HPF      Bacteria, UA 1+ /HPF      Squamous Epithelial Cells, UA 7-12 /HPF      Hyaline Casts, UA 0-2 /LPF      Calcium Oxalate Crystals, UA Moderate/2+ /HPF      Methodology Manual Light Microscopy    Wet Prep, Genital - Swab, Vagina [[id number]]  (Abnormal) Collected:  05/01/20 1627    Specimen:  Swab from Vagina Updated:  05/01/20 1701     YEAST No yeast seen     HYPHAL ELEMENTS No Hyphal elements seen     WBC'S 1+ WBC's seen     Clue Cells, Wet Prep No Clue cells seen     Trichomonas, Wet Prep No Trichomonas seen    Urinalysis With Culture If Indicated - Urine, Catheter In/Out [[id number]]  (Abnormal) Collected:  05/01/20 1627    Specimen:  Urine, Catheter In/Out Updated:  05/01/20 1700     Color, UA Dark Yellow     Appearance, UA Clear     pH, UA 6.0     Specific Gravity, UA 1.027     Glucose, UA Negative     Ketones, UA Trace     Bilirubin, UA Negative     Blood, UA Trace     Protein, UA Negative     Leuk Esterase, UA Trace     Nitrite, UA Negative     Urobilinogen, UA 1.0 E.U./dL          Imaging Results (Last 48 Hours)     ** No results found for the last 48 hours. **          Orders (last 48 hrs)      Start     Ordered    05/02/20 1300  penicillin G in iso-osmotic dextrose IVPB 3 million units (premix)  Every 4 Hours,   Status:  Discontinued      05/02/20 0804 05/02/20 1000  AZITHROMYCIN 500 MG/250 ML 0.9% NS IVPB (vial-mate)  Every 24 Hours,   Status:  Discontinued      05/02/20 0800    05/02/20 0900  nicotine (NICODERM CQ) 21 MG/24HR patch 1 patch  Every 24 Hours Scheduled,   Status:  Discontinued      05/01/20 2059 05/02/20 0900  penicillin G potassium injection 5 Million Units  Once,   Status:  Discontinued      05/02/20 0800 05/02/20 0900  penicillin G in iso-osmotic dextrose IVPB 3 million units (premix)  Every 4 Hours,   Status:   Discontinued     Note to Pharmacy:  Start 4 hours after 5 million dose given    05/02/20 0800    05/02/20 0900  penicillin g 5 mu/100 mL 0.9% NS IVPB (mbp)  Once      05/02/20 0804    05/02/20 0000  HSV 1/2, PCR - ,     Comments:  This is an external result entered through the Results Console.    05/02/20 0755    05/02/20 0000  Chlamydia trachomatis, Neisseria gonorrhoeae, PCR w/ confirmation - Swab, Vagina     Comments:  This is an external result entered through the Results Console.      05/02/20 0755    05/02/20 0000  Gonorrhea Screen - Swab,     Comments:  This is an external result entered through the Results Console.      05/02/20 0755    05/02/20 0000  Hepatitis B Surface Antigen     Comments:  This is an external result entered through the Results Console.      05/02/20 0755    05/02/20 0000  RPR     Comments:  This is an external result entered through the Results Console.      05/02/20 0755    05/02/20 0000  Rubella Antibody, IgG     Comments:  This is an external result entered through the Results Console.      05/02/20 0755    05/02/20 0000  HIV-1 Antibody, EIA     Comments:  This is an external result entered through the Results Console.      05/02/20 0755    05/01/20 2145  magnesium sulfate 20 GM/500ML infusion  Continuous,   Status:  Discontinued      05/01/20 2046 05/01/20 2046  ondansetron (ZOFRAN) injection 4 mg  Every 6 Hours PRN,   Status:  Discontinued      05/01/20 2046 05/01/20 2046  acetaminophen (TYLENOL) tablet 650 mg  Every 6 Hours PRN,   Status:  Discontinued      05/01/20 2046 05/01/20 2044  calcium gluconate injection 1 g  Once As Needed,   Status:  Discontinued      05/01/20 2046 05/01/20 2041  Position Change - For Intra-Uterine Resusitation for Hypertonus, HyperStimulation or Non-Reassuring Fetal Status  As Needed,   Status:  Canceled      05/01/20 2046 05/01/20 1800  lactated ringers infusion  Continuous,   Status:  Discontinued      05/01/20 1710           "    Ventilator/Non-Invasive Ventilation Settings (From admission, onward)    None        Operative/Procedure Notes (all)    No notes of this type exist for this encounter.            Physician Progress Notes (last 72 hours) (Notes from 05/01/20 0847 through 05/04/20 0847)      Brisa Ding MD at 05/02/20 0805          [facility]  Obstetric Progress Note    Subjective     Patient:    The patient feels much better.  States has not felt contractions overnight.  However, stated felt like \"fluid was leaking around tube thing\".  Amnisure performed this morning which returned positive.  Bedside ultrasound performed by MD which did show some fluid around baby with footling breech presentation, hourglassing membranes with cervix 2-3cm dilated by ultrasound. Very candid conversation with patient regarding fetal viability      Objective     Vital Signs Range for the last 24 hours  Temp:  [97.9 °F (36.6 °C)-98.8 °F (37.1 °C)] 97.9 °F (36.6 °C)   Temp src: Oral   BP: (111-129)/(51-70) 120/62   Heart Rate:  [] 100   Resp:  [16-20] 16   SpO2:  [91 %-100 %] 95 %       Device (Oxygen Therapy): room air   Weight:  [92.1 kg (203 lb)] 92.1 kg (203 lb)       Flowsheet Rows      First Filed Value   Admission Height  157.5 cm (62\") Documented at 05/01/2020 1545   Admission Weight  92.1 kg (203 lb) Documented at 05/01/2020 1545          Intake/Output last 24 hours:      Intake/Output Summary (Last 24 hours) at 5/2/2020 0805  Last data filed at 5/2/2020 0630  Gross per 24 hour   Intake 3400 ml   Output 1875 ml   Net 1525 ml       Intake/Output this shift:    No intake/output data recorded.    Physical Exam:  General: Patient is in no acute distress   Heart CVS exam: tachycardic    Lungs Chest: no tachypnea, retractions or cyanosis.     Abdomen Abdominal exam: tenderness not noted.   Extremities Exam of extremities: no pedal edema noted     Presentation: Footling breech   Cervix: Exam by: Method: sterile exam per physician "   Dilation: Cervical Dilation (cm): 2-3   Effacement:     Station:           Fetal Heart Rate Assessment   Method: Fetal HR Assessment Method: external   Beats/min: Fetal HR (beats/min): 135   Baseline: Fetal Heart Baseline Rate: normal range   Varibility: Fetal HR Variability: moderate (amplitude range 6 to 25 bpm)   Accels: Fetal HR Accelerations: greater than/equal to 10 bpm (32 wks gest or less), lasts at least 10 seconds (32 wks gest or less)   Decels: Fetal HR Decelerations: variable   Tracing Category: Fetal HR Tracing Category: Category II     Uterine Assessment   Method: Method: palpation, per patient report, external tocotransducer   Frequency (min): Contraction Frequency (Minutes): occasional   Ctx Count in 10 min:     Duration:     Intensity: Contraction Intensity: mild by palpation   Intensity by IUPC:     Resting Tone: Uterine Resting Tone: soft by palpation   Resting Tone by IUPC:     Bong Units:         Assessment/Plan       Pregnancy     labor        Assessment:  1.  Intrauterine pregnancy at 22w5d gestation with equivocal fetal status.    2.   labor  with possible ROM and PPROM  without chorioamnionitis  3.  Obstetrical history significant for is non-contributory.  4.  GBS status: No results found for: STREPGPB    Plan:  1. fetal and uterine monitoring  continuously, tocolysis with magnesium sulfate, IV antibiotics, expectant management and NICU consult  2. Plan of care has been reviewed with patient and spouse  3.  Risks, benefits of treatment plan have been discussed.  4.  All questions have been answered.  5.  NICU consult  6.  Case d/w Dr. Luu, [facility] who has agreed to accept transfer      Brisa Ding MD  2020  08:05    Electronically signed by Brisa Ding MD at 20 0810       Consult Notes (last 72 hours) (Notes from 20 0847 through 20 0847)    No notes of this type exist for this encounter.         Discharge Summary     No notes of this type exist for this encounter.       Nathalie Goddard, RN   Registered Nurse      Nursing Note   Signed   Date of Service:  05/02/20 0915   Creation Time:  05/02/20 0915            Signed             Show:Clear all  [x]Manual[x]Template[]Copied    Added by:  [x]Nathalie Goddard, RN    []Jyotsna for details  NAM Hunter from [facility] notified of EFW of 1 lb and ALFREDO of 13. Patient has left unit with [facility].     Nathalie Goddard RN  09:16                 Moni Chen, RN   Registered Nurse   Obstetrics   Nursing Note   Signed   Date of Service:  05/01/20 2130   Creation Time:  05/02/20 0011            Signed             Show:Clear all  [x]Manual[]Template[]Copied    Added by:  [x]Moni Chen, RN    []Jyotsna for details  Pt off the monitor from 2351-1619 to move from a triage room to a regular room for further stay due to Magnesium Therapy

## 2020-11-25 PROCEDURE — U0003 INFECTIOUS AGENT DETECTION BY NUCLEIC ACID (DNA OR RNA); SEVERE ACUTE RESPIRATORY SYNDROME CORONAVIRUS 2 (SARS-COV-2) (CORONAVIRUS DISEASE [COVID-19]), AMPLIFIED PROBE TECHNIQUE, MAKING USE OF HIGH THROUGHPUT TECHNOLOGIES AS DESCRIBED BY CMS-2020-01-R: HCPCS | Performed by: NURSE PRACTITIONER

## 2020-11-25 PROCEDURE — 84703 CHORIONIC GONADOTROPIN ASSAY: CPT | Performed by: NURSE PRACTITIONER

## 2021-01-05 LAB
EXTERNAL GROUP B STREP ANTIGEN: POSITIVE
EXTERNAL GROUP B STREP ANTIGEN: POSITIVE
EXTERNAL HEPATITIS B SURFACE ANTIGEN: NEGATIVE
EXTERNAL RUBELLA QUALITATIVE: NORMAL
EXTERNAL SYPHILIS RPR SCREEN: NORMAL
HIV1 P24 AG SERPL QL IA: NORMAL

## 2021-01-10 LAB — VZV IGG SER QL: NORMAL

## 2021-04-11 ENCOUNTER — HOSPITAL ENCOUNTER (EMERGENCY)
Facility: HOSPITAL | Age: 26
Discharge: HOME | End: 2021-04-12
Admitting: EMERGENCY MEDICINE

## 2021-04-11 ENCOUNTER — APPOINTMENT (OUTPATIENT)
Dept: ULTRASOUND IMAGING | Facility: HOSPITAL | Age: 26
End: 2021-04-11

## 2021-04-11 DIAGNOSIS — Z3A.19 19 WEEKS GESTATION OF PREGNANCY: ICD-10-CM

## 2021-04-11 DIAGNOSIS — B34.8 RHINOVIRUS: Primary | ICD-10-CM

## 2021-04-11 DIAGNOSIS — Z67.40 TYPE O BLOOD, RH POSITIVE: ICD-10-CM

## 2021-04-11 LAB
ABO GROUP BLD: NORMAL
ALBUMIN SERPL-MCNC: 3.6 G/DL (ref 3.5–5.2)
ALBUMIN/GLOB SERPL: 0.9 G/DL
ALP SERPL-CCNC: 88 U/L (ref 39–117)
ALT SERPL W P-5'-P-CCNC: 12 U/L (ref 1–33)
ANION GAP SERPL CALCULATED.3IONS-SCNC: 11 MMOL/L (ref 5–15)
AST SERPL-CCNC: 22 U/L (ref 1–32)
B PARAPERT DNA SPEC QL NAA+PROBE: NOT DETECTED
B PERT DNA SPEC QL NAA+PROBE: NOT DETECTED
BASOPHILS # BLD AUTO: 0.05 10*3/MM3 (ref 0–0.2)
BASOPHILS NFR BLD AUTO: 0.3 % (ref 0–1.5)
BILIRUB SERPL-MCNC: 0.3 MG/DL (ref 0–1.2)
BILIRUB UR QL STRIP: NEGATIVE
BLD GP AB SCN SERPL QL: NEGATIVE
BUN SERPL-MCNC: 6 MG/DL (ref 6–20)
BUN/CREAT SERPL: ABNORMAL
C PNEUM DNA NPH QL NAA+NON-PROBE: NOT DETECTED
CALCIUM SPEC-SCNC: 9.7 MG/DL (ref 8.6–10.5)
CHLORIDE SERPL-SCNC: 104 MMOL/L (ref 98–107)
CLARITY UR: CLEAR
CLUE CELLS SPEC QL WET PREP: ABNORMAL
CO2 SERPL-SCNC: 22 MMOL/L (ref 22–29)
COLOR UR: YELLOW
CREAT SERPL-MCNC: <0.47 MG/DL (ref 0.57–1)
DEPRECATED RDW RBC AUTO: 43 FL (ref 37–54)
EOSINOPHIL # BLD AUTO: 0.33 10*3/MM3 (ref 0–0.4)
EOSINOPHIL NFR BLD AUTO: 1.9 % (ref 0.3–6.2)
ERYTHROCYTE [DISTWIDTH] IN BLOOD BY AUTOMATED COUNT: 14.2 % (ref 12.3–15.4)
FLUAV SUBTYP SPEC NAA+PROBE: NOT DETECTED
FLUBV RNA ISLT QL NAA+PROBE: NOT DETECTED
GLOBULIN UR ELPH-MCNC: 3.8 GM/DL
GLUCOSE SERPL-MCNC: 82 MG/DL (ref 65–99)
GLUCOSE UR STRIP-MCNC: NEGATIVE MG/DL
HADV DNA SPEC NAA+PROBE: NOT DETECTED
HCOV 229E RNA SPEC QL NAA+PROBE: NOT DETECTED
HCOV HKU1 RNA SPEC QL NAA+PROBE: NOT DETECTED
HCOV NL63 RNA SPEC QL NAA+PROBE: NOT DETECTED
HCOV OC43 RNA SPEC QL NAA+PROBE: NOT DETECTED
HCT VFR BLD AUTO: 35.6 % (ref 34–46.6)
HGB BLD-MCNC: 12 G/DL (ref 12–15.9)
HGB UR QL STRIP.AUTO: NEGATIVE
HMPV RNA NPH QL NAA+NON-PROBE: NOT DETECTED
HPIV1 RNA SPEC QL NAA+PROBE: NOT DETECTED
HPIV2 RNA SPEC QL NAA+PROBE: NOT DETECTED
HPIV3 RNA NPH QL NAA+PROBE: NOT DETECTED
HPIV4 P GENE NPH QL NAA+PROBE: NOT DETECTED
HYDATID CYST SPEC WET PREP: ABNORMAL
IMM GRANULOCYTES # BLD AUTO: 0.2 10*3/MM3 (ref 0–0.05)
IMM GRANULOCYTES NFR BLD AUTO: 1.1 % (ref 0–0.5)
KETONES UR QL STRIP: ABNORMAL
LEUKOCYTE ESTERASE UR QL STRIP.AUTO: NEGATIVE
LYMPHOCYTES # BLD AUTO: 2.58 10*3/MM3 (ref 0.7–3.1)
LYMPHOCYTES NFR BLD AUTO: 14.7 % (ref 19.6–45.3)
M PNEUMO IGG SER IA-ACNC: NOT DETECTED
MCH RBC QN AUTO: 28.2 PG (ref 26.6–33)
MCHC RBC AUTO-ENTMCNC: 33.7 G/DL (ref 31.5–35.7)
MCV RBC AUTO: 83.6 FL (ref 79–97)
MONOCYTES # BLD AUTO: 1.07 10*3/MM3 (ref 0.1–0.9)
MONOCYTES NFR BLD AUTO: 6.1 % (ref 5–12)
NEUTROPHILS NFR BLD AUTO: 13.38 10*3/MM3 (ref 1.7–7)
NEUTROPHILS NFR BLD AUTO: 75.9 % (ref 42.7–76)
NITRITE UR QL STRIP: NEGATIVE
NRBC BLD AUTO-RTO: 0 /100 WBC (ref 0–0.2)
NUMBER OF DOSES: NORMAL
PH UR STRIP.AUTO: 6.5 [PH] (ref 5–8)
PLATELET # BLD AUTO: 298 10*3/MM3 (ref 140–450)
PMV BLD AUTO: 10.3 FL (ref 6–12)
POTASSIUM SERPL-SCNC: 3.6 MMOL/L (ref 3.5–5.2)
PROT SERPL-MCNC: 7.4 G/DL (ref 6–8.5)
PROT UR QL STRIP: NEGATIVE
RBC # BLD AUTO: 4.26 10*6/MM3 (ref 3.77–5.28)
RH BLD: POSITIVE
RHINOVIRUS RNA SPEC NAA+PROBE: DETECTED
RSV RNA NPH QL NAA+NON-PROBE: NOT DETECTED
SARS-COV-2 RNA NPH QL NAA+NON-PROBE: NOT DETECTED
SODIUM SERPL-SCNC: 137 MMOL/L (ref 136–145)
SP GR UR STRIP: 1.01 (ref 1–1.03)
T VAGINALIS SPEC QL WET PREP: ABNORMAL
UROBILINOGEN UR QL STRIP: ABNORMAL
WBC # BLD AUTO: 17.61 10*3/MM3 (ref 3.4–10.8)
WBC SPEC QL WET PREP: ABNORMAL
YEAST GENITAL QL WET PREP: ABNORMAL

## 2021-04-11 PROCEDURE — 86901 BLOOD TYPING SEROLOGIC RH(D): CPT | Performed by: PHYSICIAN ASSISTANT

## 2021-04-11 PROCEDURE — 85025 COMPLETE CBC W/AUTO DIFF WBC: CPT | Performed by: PHYSICIAN ASSISTANT

## 2021-04-11 PROCEDURE — 81003 URINALYSIS AUTO W/O SCOPE: CPT | Performed by: PHYSICIAN ASSISTANT

## 2021-04-11 PROCEDURE — 86900 BLOOD TYPING SEROLOGIC ABO: CPT | Performed by: PHYSICIAN ASSISTANT

## 2021-04-11 PROCEDURE — 76815 OB US LIMITED FETUS(S): CPT

## 2021-04-11 PROCEDURE — 99284 EMERGENCY DEPT VISIT MOD MDM: CPT

## 2021-04-11 PROCEDURE — 86850 RBC ANTIBODY SCREEN: CPT | Performed by: PHYSICIAN ASSISTANT

## 2021-04-11 PROCEDURE — 87210 SMEAR WET MOUNT SALINE/INK: CPT | Performed by: PHYSICIAN ASSISTANT

## 2021-04-11 PROCEDURE — 87086 URINE CULTURE/COLONY COUNT: CPT | Performed by: PHYSICIAN ASSISTANT

## 2021-04-11 PROCEDURE — 0202U NFCT DS 22 TRGT SARS-COV-2: CPT | Performed by: PHYSICIAN ASSISTANT

## 2021-04-11 PROCEDURE — 80053 COMPREHEN METABOLIC PANEL: CPT | Performed by: PHYSICIAN ASSISTANT

## 2021-04-11 RX ORDER — SODIUM CHLORIDE 0.9 % (FLUSH) 0.9 %
10 SYRINGE (ML) INJECTION AS NEEDED
Status: DISCONTINUED | OUTPATIENT
Start: 2021-04-11 | End: 2021-04-12 | Stop reason: HOSPADM

## 2021-04-11 RX ADMIN — SODIUM CHLORIDE 1000 ML: 9 INJECTION, SOLUTION INTRAVENOUS at 22:08

## 2021-04-12 VITALS
HEART RATE: 85 BPM | TEMPERATURE: 98.2 F | OXYGEN SATURATION: 95 % | SYSTOLIC BLOOD PRESSURE: 136 MMHG | DIASTOLIC BLOOD PRESSURE: 68 MMHG | WEIGHT: 217 LBS | HEIGHT: 62 IN | BODY MASS INDEX: 39.93 KG/M2 | RESPIRATION RATE: 18 BRPM

## 2021-04-12 NOTE — ED PROVIDER NOTES
Subjective   History of Present Illness    Patient is a 25-year-old female presenting to ED with URI symptoms and pregnancy.  Patient approximately 19 weeks pregnant.  Patient  A1.  Patient currently following with Dr. Ding for her pregnancy.  Patient reports with her previous pregnancy she delivered at 22 weeks and her  passed away shortly after due to complications in the NICU.  Patient reports that during that pregnancy she had difficulties with her cervix.  Patient reports she is concerned because over the past few days she has developed worsening allergy, cough, runny nose, and congestion symptoms that she cannot relieve at home.  Patient reports that both with and without coughing episodes today she started developing sharp suprapubic pains which felt to her like Westmoreland Barba contractions.  Patient is concerned that she has put too much strain on her incompetent cervix.  Patient significant other is in the ED being tested for similar symptoms.  Patient adamantly denies any change in vaginal discharge, vaginal bleeding/spotting/clotting, further abdominal upper pains, flank pain, dysuria, hematuria.  Patient denies any fevers, chills, diaphoresis.  Patient denies any nausea or vomiting.  Patient has had this pregnancy confirmed on multiple ultrasounds and is being followed closely due to her history.    Records reviewed show patient has been seen during this pregnancy through [facility] maternal [facility] fetal [facility] for management of her history of  delivery.  Patient was last seen on 2021 with no concerns and advised for routine 2-week follow-up.    Review of Systems   Constitutional: Negative.  Negative for chills, diaphoresis and fever.   HENT: Positive for congestion, rhinorrhea and sinus pressure. Negative for sore throat.    Eyes: Negative.    Respiratory: Positive for cough (Nonproductive). Negative for shortness of breath and wheezing.    Cardiovascular: Negative.   "  Gastrointestinal: Positive for abdominal pain (Suprapubic \"Zackery Barba sharp cramping\"). Negative for nausea and vomiting.   Genitourinary: Positive for pelvic pain. Negative for dysuria, flank pain, hematuria, vaginal bleeding and vaginal discharge.        Reports + pregnancy (19 weeks)   Musculoskeletal: Negative.    Skin: Negative.    Neurological: Negative.  Negative for syncope and headaches.   Psychiatric/Behavioral: Negative.    All other systems reviewed and are negative.      Past Medical History:   Diagnosis Date   • Back pain    • Bipolar disorder (CMS/HCC)    • Depression        No Known Allergies    Past Surgical History:   Procedure Laterality Date   • EAR TUBES Bilateral        History reviewed. No pertinent family history.    Social History     Socioeconomic History   • Marital status:      Spouse name: Not on file   • Number of children: Not on file   • Years of education: Not on file   • Highest education level: Not on file   Tobacco Use   • Smoking status: Current Every Day Smoker     Packs/day: 0.50     Years: 3.00     Pack years: 1.50     Types: Cigarettes   • Smokeless tobacco: Never Used   Substance and Sexual Activity   • Alcohol use: Never   • Drug use: No   • Sexual activity: Defer           Objective   Physical Exam  Vitals and nursing note reviewed. Exam conducted with a chaperone present (Chaperone present for entire examination, Marielle BROWNING).   Constitutional:       General: She is not in acute distress.     Appearance: Normal appearance. She is well-developed, well-groomed and overweight. She is not diaphoretic.   HENT:      Head: Normocephalic.      Right Ear: Tympanic membrane, ear canal and external ear normal.      Left Ear: Tympanic membrane, ear canal and external ear normal.      Nose: Congestion present. No rhinorrhea.      Right Sinus: No maxillary sinus tenderness or frontal sinus tenderness.      Left Sinus: No maxillary sinus tenderness or frontal sinus tenderness. "      Mouth/Throat:      Mouth: Mucous membranes are moist.      Pharynx: Oropharynx is clear. Posterior oropharyngeal erythema present.   Eyes:      General: No scleral icterus.     Extraocular Movements: Extraocular movements intact.      Conjunctiva/sclera: Conjunctivae normal.      Pupils: Pupils are equal, round, and reactive to light.   Cardiovascular:      Rate and Rhythm: Regular rhythm. Tachycardia present.   Pulmonary:      Effort: Pulmonary effort is normal. No respiratory distress.      Breath sounds: Normal breath sounds. No wheezing.   Chest:      Chest wall: No tenderness (No reproducible tenderness to palpitation of chest wall).   Abdominal:      General: Bowel sounds are normal.      Palpations: Abdomen is soft.      Tenderness: There is no abdominal tenderness. There is no right CVA tenderness, left CVA tenderness or guarding.   Genitourinary:     Exam position: Supine.      Pubic Area: No rash.       Labia:         Right: No rash, tenderness or lesion.         Left: No rash, tenderness or lesion.       Vagina: Vaginal discharge (Scant, white) present. No tenderness, bleeding or lesions.      Cervix: No cervical motion tenderness, friability, erythema or cervical bleeding.      Uterus: Normal.       Adnexa:         Right: No tenderness.          Left: No tenderness.     Musculoskeletal:         General: Normal range of motion.      Cervical back: Normal range of motion.      Right lower leg: No edema.      Left lower leg: No edema.   Skin:     General: Skin is warm and dry.   Neurological:      General: No focal deficit present.      Mental Status: She is alert and oriented to person, place, and time.      Gait: Gait normal.   Psychiatric:         Attention and Perception: Attention normal.         Mood and Affect: Mood and affect normal.         Speech: Speech normal.         Behavior: Behavior normal. Behavior is cooperative.         Procedures           ED Course                                            MDM  Number of Diagnoses or Management Options     Amount and/or Complexity of Data Reviewed  Clinical lab tests: reviewed and ordered  Tests in the radiology section of CPT®: reviewed and ordered  Tests in the medicine section of CPT®: ordered and reviewed  Decide to obtain previous medical records or to obtain history from someone other than the patient: yes  Review and summarize past medical records: yes  Discuss the patient with other providers: yes (Dr. Quigley (attending))    Patient Progress  Patient progress: improved      Patient is a 25-year-old female presenting to ED with URI symptoms and pregnancy.  PMH significant for  A1  delivery with subsequent passing of the fetus at 22 weeks.  Lab work notable for leukocytosis of 17.61, ANC 13.38.  No CMP abnormalities.  Urine studies with no evidence of infection or hematuria. Wet prep revealed 4+ white blood cells.  Blood type noted to be O+ and no indication for RhoGam.  Respiratory panel showed evidence of positive human rhinovirus.  Negative COVID-19.  Ultrasound confirmed a single live intrauterine fetus in the breech position.  Cervix appears to be closed.  Patient was initially slightly hypertensive and tachycardic which resolved after IV fluids and reassurance on ultrasound findings.  Patient remained afebrile.  Patient educated on importance of continued close follow-up with her OB/GYN.  Discussed symptomatic treatment in pregnancy for URI symptoms.  Advised of strict return precautions.  Patient very appreciative with no further questions, concerns, needs at this time and is stable for discharge.  Patient was able to tolerate p.o. fluids and ambulate without difficulty.  Case was discussed with Dr. Alexis Quigley who is in agreement with no further recommendations.      Final diagnoses:   Rhinovirus   19 weeks gestation of pregnancy   Type O blood, Rh positive   Breech presentation, single or unspecified fetus       ED  Disposition  ED Disposition     ED Disposition Condition Comment    Discharge Stable           Brisa Ding MD  [address]  [facility] [id number] [phone]          [facility] Emergency Department  [facility] [facility] [address] [phone]    As needed         Medication List      No changes were made to your prescriptions during this visit.          Pérez Freitas PA-C  04/14/21 1406

## 2021-04-12 NOTE — DISCHARGE INSTRUCTIONS
Your ultrasound shows you are 19 weeks 3 days with an appropriate fetal heart rate for dequan girl.  Your cervix is normal appearing. Baby girl is breech with a normal placenta.   You tested positive for human rhinovirus, also known as the common cold.  For this we will treat you symptomatically with rest, hydration, and continued use of Tylenol.  Please continue to follow-up with your OB/GYN provider as routinely scheduled.  You tested negative for influenza, negative for RSV, negative for COVID-19, negative for urinary tract infections, and have no evidence of bacterial vaginitis/yeast infections.   Please note that your blood type is O POSITIVE.

## 2021-04-13 LAB — BACTERIA SPEC AEROBE CULT: NORMAL

## 2021-05-11 ENCOUNTER — HOSPITAL ENCOUNTER (INPATIENT)
Facility: HOSPITAL | Age: 26
LOS: 2 days | Discharge: HOME | End: 2021-05-13
Attending: OBSTETRICS & GYNECOLOGY | Admitting: OBSTETRICS & GYNECOLOGY

## 2021-05-11 LAB
ABO GROUP BLD: NORMAL
BASOPHILS # BLD AUTO: 0.05 10*3/MM3 (ref 0–0.2)
BASOPHILS NFR BLD AUTO: 0.4 % (ref 0–1.5)
BILIRUB UR QL STRIP: NEGATIVE
BLD GP AB SCN SERPL QL: NEGATIVE
CLARITY UR: CLEAR
CLUE CELLS SPEC QL WET PREP: ABNORMAL
COLOR UR: YELLOW
DEPRECATED RDW RBC AUTO: 43.1 FL (ref 37–54)
EOSINOPHIL # BLD AUTO: 0.26 10*3/MM3 (ref 0–0.4)
EOSINOPHIL NFR BLD AUTO: 1.9 % (ref 0.3–6.2)
ERYTHROCYTE [DISTWIDTH] IN BLOOD BY AUTOMATED COUNT: 14.1 % (ref 12.3–15.4)
GLUCOSE UR STRIP-MCNC: NEGATIVE MG/DL
HCT VFR BLD AUTO: 34.1 % (ref 34–46.6)
HGB BLD-MCNC: 11.6 G/DL (ref 12–15.9)
HGB UR QL STRIP.AUTO: NEGATIVE
HYDATID CYST SPEC WET PREP: ABNORMAL
IMM GRANULOCYTES # BLD AUTO: 0.19 10*3/MM3 (ref 0–0.05)
IMM GRANULOCYTES NFR BLD AUTO: 1.4 % (ref 0–0.5)
KETONES UR QL STRIP: NEGATIVE
LEUKOCYTE ESTERASE UR QL STRIP.AUTO: NEGATIVE
LYMPHOCYTES # BLD AUTO: 1.76 10*3/MM3 (ref 0.7–3.1)
LYMPHOCYTES NFR BLD AUTO: 12.7 % (ref 19.6–45.3)
MCH RBC QN AUTO: 28.6 PG (ref 26.6–33)
MCHC RBC AUTO-ENTMCNC: 34 G/DL (ref 31.5–35.7)
MCV RBC AUTO: 84 FL (ref 79–97)
MONOCYTES # BLD AUTO: 0.71 10*3/MM3 (ref 0.1–0.9)
MONOCYTES NFR BLD AUTO: 5.1 % (ref 5–12)
NEUTROPHILS NFR BLD AUTO: 10.91 10*3/MM3 (ref 1.7–7)
NEUTROPHILS NFR BLD AUTO: 78.5 % (ref 42.7–76)
NITRITE UR QL STRIP: NEGATIVE
NRBC BLD AUTO-RTO: 0 /100 WBC (ref 0–0.2)
PH UR STRIP.AUTO: 6.5 [PH] (ref 5–8)
PLATELET # BLD AUTO: 301 10*3/MM3 (ref 140–450)
PMV BLD AUTO: 10.1 FL (ref 6–12)
PROT UR QL STRIP: NEGATIVE
RBC # BLD AUTO: 4.06 10*6/MM3 (ref 3.77–5.28)
RH BLD: POSITIVE
SP GR UR STRIP: 1.01 (ref 1–1.03)
T VAGINALIS SPEC QL WET PREP: ABNORMAL
T&S EXPIRATION DATE: NORMAL
UROBILINOGEN UR QL STRIP: NORMAL
WBC # BLD AUTO: 13.88 10*3/MM3 (ref 3.4–10.8)
WBC SPEC QL WET PREP: ABNORMAL
YEAST GENITAL QL WET PREP: ABNORMAL

## 2021-05-11 PROCEDURE — 85025 COMPLETE CBC W/AUTO DIFF WBC: CPT | Performed by: OBSTETRICS & GYNECOLOGY

## 2021-05-11 PROCEDURE — 86900 BLOOD TYPING SEROLOGIC ABO: CPT | Performed by: OBSTETRICS & GYNECOLOGY

## 2021-05-11 PROCEDURE — 81003 URINALYSIS AUTO W/O SCOPE: CPT | Performed by: OBSTETRICS & GYNECOLOGY

## 2021-05-11 PROCEDURE — 86850 RBC ANTIBODY SCREEN: CPT | Performed by: OBSTETRICS & GYNECOLOGY

## 2021-05-11 PROCEDURE — 25010000002 BETAMETHASONE ACET & SOD PHOS PER 4 MG: Performed by: OBSTETRICS & GYNECOLOGY

## 2021-05-11 PROCEDURE — 25010000003 MAGNESIUM SULFATE 20 GM/500ML SOLUTION: Performed by: OBSTETRICS & GYNECOLOGY

## 2021-05-11 PROCEDURE — 87210 SMEAR WET MOUNT SALINE/INK: CPT | Performed by: OBSTETRICS & GYNECOLOGY

## 2021-05-11 PROCEDURE — 86901 BLOOD TYPING SEROLOGIC RH(D): CPT | Performed by: OBSTETRICS & GYNECOLOGY

## 2021-05-11 RX ORDER — PROGESTERONE 100 MG/1
200 CAPSULE ORAL 2 TIMES DAILY
Status: DISCONTINUED | OUTPATIENT
Start: 2021-05-11 | End: 2021-05-13 | Stop reason: HOSPADM

## 2021-05-11 RX ORDER — BETAMETHASONE SODIUM PHOSPHATE AND BETAMETHASONE ACETATE 3; 3 MG/ML; MG/ML
12 INJECTION, SUSPENSION INTRA-ARTICULAR; INTRALESIONAL; INTRAMUSCULAR; SOFT TISSUE EVERY 24 HOURS
Status: COMPLETED | OUTPATIENT
Start: 2021-05-11 | End: 2021-05-12

## 2021-05-11 RX ORDER — MAGNESIUM SULFATE HEPTAHYDRATE 40 MG/ML
2 INJECTION, SOLUTION INTRAVENOUS CONTINUOUS
Status: DISCONTINUED | OUTPATIENT
Start: 2021-05-11 | End: 2021-05-13 | Stop reason: HOSPADM

## 2021-05-11 RX ORDER — SODIUM CHLORIDE 0.9 % (FLUSH) 0.9 %
10 SYRINGE (ML) INJECTION EVERY 12 HOURS SCHEDULED
Status: DISCONTINUED | OUTPATIENT
Start: 2021-05-11 | End: 2021-05-13 | Stop reason: HOSPADM

## 2021-05-11 RX ORDER — NICOTINE 21 MG/24HR
1 PATCH, TRANSDERMAL 24 HOURS TRANSDERMAL
Status: DISCONTINUED | OUTPATIENT
Start: 2021-05-11 | End: 2021-05-13 | Stop reason: HOSPADM

## 2021-05-11 RX ORDER — SODIUM CHLORIDE 0.9 % (FLUSH) 0.9 %
10 SYRINGE (ML) INJECTION AS NEEDED
Status: DISCONTINUED | OUTPATIENT
Start: 2021-05-11 | End: 2021-05-13 | Stop reason: HOSPADM

## 2021-05-11 RX ORDER — SODIUM CHLORIDE, SODIUM LACTATE, POTASSIUM CHLORIDE, CALCIUM CHLORIDE 600; 310; 30; 20 MG/100ML; MG/100ML; MG/100ML; MG/100ML
50 INJECTION, SOLUTION INTRAVENOUS CONTINUOUS
Status: DISCONTINUED | OUTPATIENT
Start: 2021-05-11 | End: 2021-05-13 | Stop reason: HOSPADM

## 2021-05-11 RX ADMIN — MAGNESIUM SULFATE HEPTAHYDRATE 2 G/HR: 40 INJECTION, SOLUTION INTRAVENOUS at 20:18

## 2021-05-11 RX ADMIN — BETAMETHASONE ACETATE AND BETAMETHASONE SODIUM PHOSPHATE 12 MG: 3; 3 INJECTION, SUSPENSION INTRA-ARTICULAR; INTRALESIONAL; INTRAMUSCULAR; SOFT TISSUE at 12:33

## 2021-05-11 RX ADMIN — SODIUM CHLORIDE, POTASSIUM CHLORIDE, SODIUM LACTATE AND CALCIUM CHLORIDE 50 ML/HR: 600; 310; 30; 20 INJECTION, SOLUTION INTRAVENOUS at 12:18

## 2021-05-11 RX ADMIN — PROGESTERONE 200 MG: 100 CAPSULE ORAL at 20:25

## 2021-05-11 RX ADMIN — MAGNESIUM SULFATE HEPTAHYDRATE 2 G/HR: 40 INJECTION, SOLUTION INTRAVENOUS at 12:45

## 2021-05-11 RX ADMIN — PROGESTERONE 200 MG: 100 CAPSULE ORAL at 14:19

## 2021-05-12 PROCEDURE — 51702 INSERT TEMP BLADDER CATH: CPT

## 2021-05-12 PROCEDURE — 25010000002 BETAMETHASONE ACET & SOD PHOS PER 4 MG: Performed by: OBSTETRICS & GYNECOLOGY

## 2021-05-12 PROCEDURE — 0T9B70Z DRAINAGE OF BLADDER WITH DRAINAGE DEVICE, VIA NATURAL OR ARTIFICIAL OPENING: ICD-10-PCS | Performed by: OBSTETRICS & GYNECOLOGY

## 2021-05-12 PROCEDURE — 25010000003 MAGNESIUM SULFATE 20 GM/500ML SOLUTION: Performed by: OBSTETRICS & GYNECOLOGY

## 2021-05-12 RX ADMIN — MAGNESIUM SULFATE HEPTAHYDRATE 2 G/HR: 40 INJECTION, SOLUTION INTRAVENOUS at 06:04

## 2021-05-12 RX ADMIN — PROGESTERONE 200 MG: 100 CAPSULE ORAL at 20:44

## 2021-05-12 RX ADMIN — PROGESTERONE 100 MG: 100 CAPSULE ORAL at 09:06

## 2021-05-12 RX ADMIN — SODIUM CHLORIDE, POTASSIUM CHLORIDE, SODIUM LACTATE AND CALCIUM CHLORIDE 50 ML/HR: 600; 310; 30; 20 INJECTION, SOLUTION INTRAVENOUS at 06:04

## 2021-05-12 RX ADMIN — BETAMETHASONE ACETATE AND BETAMETHASONE SODIUM PHOSPHATE 12 MG: 3; 3 INJECTION, SUSPENSION INTRA-ARTICULAR; INTRALESIONAL; INTRAMUSCULAR; SOFT TISSUE at 12:33

## 2021-05-13 VITALS
DIASTOLIC BLOOD PRESSURE: 63 MMHG | SYSTOLIC BLOOD PRESSURE: 115 MMHG | RESPIRATION RATE: 16 BRPM | HEART RATE: 81 BPM | HEIGHT: 62 IN | TEMPERATURE: 98.1 F | OXYGEN SATURATION: 97 % | WEIGHT: 219 LBS | BODY MASS INDEX: 40.3 KG/M2

## 2021-05-13 PROBLEM — O60.00 PRETERM LABOR: Status: RESOLVED | Noted: 2020-05-01 | Resolved: 2021-05-13

## 2021-05-13 RX ORDER — PROGESTERONE 200 MG/1
200 CAPSULE ORAL 2 TIMES DAILY
Qty: 60 CAPSULE | Refills: 3 | Status: ON HOLD | OUTPATIENT
Start: 2021-05-13 | End: 2021-08-24

## 2021-05-13 RX ADMIN — SODIUM CHLORIDE, POTASSIUM CHLORIDE, SODIUM LACTATE AND CALCIUM CHLORIDE 50 ML/HR: 600; 310; 30; 20 INJECTION, SOLUTION INTRAVENOUS at 03:04

## 2021-05-13 RX ADMIN — PROGESTERONE 200 MG: 100 CAPSULE ORAL at 09:55

## 2021-05-14 ENCOUNTER — HOSPITAL ENCOUNTER (OUTPATIENT)
Facility: HOSPITAL | Age: 26
Discharge: HOME | End: 2021-05-14
Attending: OBSTETRICS & GYNECOLOGY | Admitting: OBSTETRICS & GYNECOLOGY

## 2021-05-14 ENCOUNTER — HOSPITAL ENCOUNTER (OUTPATIENT)
Facility: HOSPITAL | Age: 26
End: 2021-05-14
Attending: OBSTETRICS & GYNECOLOGY | Admitting: OBSTETRICS & GYNECOLOGY

## 2021-05-14 VITALS
RESPIRATION RATE: 16 BRPM | HEART RATE: 88 BPM | TEMPERATURE: 97.7 F | DIASTOLIC BLOOD PRESSURE: 77 MMHG | SYSTOLIC BLOOD PRESSURE: 136 MMHG

## 2021-05-14 LAB
BACTERIA UR QL AUTO: ABNORMAL /HPF
BILIRUB UR QL STRIP: NEGATIVE
BLOODY SPECIMEN?: NO
CLARITY UR: CLEAR
CLUE CELLS SPEC QL WET PREP: ABNORMAL
COLOR UR: YELLOW
FIBRONECTIN FETAL VAG QL: NEGATIVE
GLUCOSE UR STRIP-MCNC: NEGATIVE MG/DL
HGB UR QL STRIP.AUTO: ABNORMAL
HYALINE CASTS UR QL AUTO: ABNORMAL /LPF
HYDATID CYST SPEC WET PREP: ABNORMAL
KETONES UR QL STRIP: NEGATIVE
LEUKOCYTE ESTERASE UR QL STRIP.AUTO: NEGATIVE
NITRITE UR QL STRIP: NEGATIVE
PH UR STRIP.AUTO: 6.5 [PH] (ref 5–8)
PROT UR QL STRIP: NEGATIVE
RBC # UR: ABNORMAL /HPF
REF LAB TEST METHOD: ABNORMAL
SP GR UR STRIP: 1.01 (ref 1–1.03)
SQUAMOUS #/AREA URNS HPF: ABNORMAL /HPF
T VAGINALIS SPEC QL WET PREP: ABNORMAL
UROBILINOGEN UR QL STRIP: ABNORMAL
WBC SPEC QL WET PREP: ABNORMAL
WBC UR QL AUTO: ABNORMAL /HPF
YEAST GENITAL QL WET PREP: ABNORMAL

## 2021-05-14 PROCEDURE — G0463 HOSPITAL OUTPT CLINIC VISIT: HCPCS

## 2021-05-14 PROCEDURE — G0378 HOSPITAL OBSERVATION PER HR: HCPCS

## 2021-05-14 PROCEDURE — 87210 SMEAR WET MOUNT SALINE/INK: CPT | Performed by: OBSTETRICS & GYNECOLOGY

## 2021-05-14 PROCEDURE — 81001 URINALYSIS AUTO W/SCOPE: CPT | Performed by: OBSTETRICS & GYNECOLOGY

## 2021-05-14 PROCEDURE — 96365 THER/PROPH/DIAG IV INF INIT: CPT

## 2021-05-14 PROCEDURE — 82731 ASSAY OF FETAL FIBRONECTIN: CPT | Performed by: OBSTETRICS & GYNECOLOGY

## 2021-05-14 RX ORDER — SODIUM CHLORIDE, SODIUM LACTATE, POTASSIUM CHLORIDE, CALCIUM CHLORIDE 600; 310; 30; 20 MG/100ML; MG/100ML; MG/100ML; MG/100ML
999 INJECTION, SOLUTION INTRAVENOUS CONTINUOUS
Status: DISCONTINUED | OUTPATIENT
Start: 2021-05-14 | End: 2021-05-14 | Stop reason: HOSPADM

## 2021-05-14 RX ADMIN — METRONIDAZOLE 500 MG: 500 INJECTION, SOLUTION INTRAVENOUS at 18:56

## 2021-05-14 RX ADMIN — SODIUM CHLORIDE, POTASSIUM CHLORIDE, SODIUM LACTATE AND CALCIUM CHLORIDE 999 ML: 600; 310; 30; 20 INJECTION, SOLUTION INTRAVENOUS at 18:40

## 2021-05-15 NOTE — NURSING NOTE
Patient states pain improved and is comfortable with discharge home, explained signs of  labor, pelvic rest, importance of follow up care, and when to seek medical attention.  Patient voiced understanding.  Mary Ann Linda RN

## 2021-05-17 NOTE — PROGRESS NOTES
Venkata Avila  : 1995  MRN: [id number]  CSN: [id number]    Labor progress note    Subjective   She reports increased abdominal cramping. Recently admitted for shortened cervix with history of 2nd trimester delivery. Extremely nervous. ffn negative, wet mount shows BV.     Objective   Min/max vitals past 24 hours:  No data recorded   No data recorded   No data recorded   No data recorded        FHT's: reassuring and category 1.  external monitors used   Cervix: was not checked.   Contractions: uterine irritability      Assessment   1. IUP at 24w4d  2. No evidence of  labor  3. Reassuring surveillance for 24 weeks  4. Bacterial vaginosis      Plan   1. Flagyl   2. OK for discharge    Brisa Ding MD  2021  07:18 CDT

## 2021-06-10 ENCOUNTER — HOSPITAL ENCOUNTER (OUTPATIENT)
Facility: HOSPITAL | Age: 26
Discharge: HOME | End: 2021-06-10
Attending: OBSTETRICS & GYNECOLOGY | Admitting: OBSTETRICS & GYNECOLOGY

## 2021-06-10 VITALS
TEMPERATURE: 98.1 F | HEART RATE: 106 BPM | RESPIRATION RATE: 18 BRPM | SYSTOLIC BLOOD PRESSURE: 140 MMHG | WEIGHT: 223 LBS | BODY MASS INDEX: 41.04 KG/M2 | DIASTOLIC BLOOD PRESSURE: 81 MMHG | HEIGHT: 62 IN | OXYGEN SATURATION: 96 %

## 2021-06-10 PROCEDURE — 59025 FETAL NON-STRESS TEST: CPT

## 2021-06-10 PROCEDURE — G0378 HOSPITAL OBSERVATION PER HR: HCPCS

## 2021-06-10 PROCEDURE — G0463 HOSPITAL OUTPT CLINIC VISIT: HCPCS

## 2021-06-10 RX ORDER — AMOXICILLIN 875 MG/1
875 TABLET, COATED ORAL 2 TIMES DAILY
Status: ON HOLD | COMMUNITY
End: 2021-07-26

## 2021-06-10 NOTE — NON STRESS TEST
Akilah Avila, a  at 28w0d with an LESA of 2021, by Patient Reported, was seen at [facility] LABOR DELIVERY for a nonstress test.    Chief Complaint   Patient presents with   • Non-stress Test     sent from office        Patient Active Problem List   Diagnosis   • Pregnancy       Start Time:   Stop Time:      Interpretation A  Nonstress Test Interpretation A: Reactive (06/10/21 1714 : Ava Cameron RN)/Aishwarya Win RN

## 2021-06-11 NOTE — PROGRESS NOTES
Venkata Paulsonwell  : 1995  MRN: [id number]  CSN: [id number]    Labor progress note    Subjective   Patient complaining of decreased fetal movement     Objective   Min/max vitals past 24 hours:  Temp  Min: 98.1 °F (36.7 °C)  Max: 99.3 °F (37.4 °C)   BP  Min: 121/60  Max: 140/81   Pulse  Min: 105  Max: 106   Resp  Min: 18  Max: 18        FHT's: reactive and category 1.  external monitors used   Cervix: was not checked.   Contractions: none      Assessment   1. IUP at 28w1d  2. Decreased fetal movement  3. Reassuring fetal surveillance     Plan   1. Ok for discharge    Brisa Ding MD  2021  09:32 CDT

## 2021-06-12 ENCOUNTER — HOSPITAL ENCOUNTER (OUTPATIENT)
Facility: HOSPITAL | Age: 26
Setting detail: OBSERVATION
Discharge: HOME | End: 2021-06-12
Attending: OBSTETRICS & GYNECOLOGY | Admitting: OBSTETRICS & GYNECOLOGY

## 2021-06-12 VITALS
HEIGHT: 62 IN | HEART RATE: 101 BPM | WEIGHT: 223 LBS | DIASTOLIC BLOOD PRESSURE: 69 MMHG | SYSTOLIC BLOOD PRESSURE: 131 MMHG | BODY MASS INDEX: 41.04 KG/M2 | TEMPERATURE: 97.5 F

## 2021-06-12 PROCEDURE — 59025 FETAL NON-STRESS TEST: CPT

## 2021-06-12 PROCEDURE — G0378 HOSPITAL OBSERVATION PER HR: HCPCS

## 2021-06-12 PROCEDURE — G0463 HOSPITAL OUTPT CLINIC VISIT: HCPCS

## 2021-06-12 NOTE — NURSING NOTE
Presents to LDR with c/o possible ROM. Fern and Nitrazine negative. Dr. Ding notified and orders received for discharge.

## 2021-06-14 NOTE — PROGRESS NOTES
Venkata Avila  : 1995  MRN: [id number]  CSN: [id number]    Labor progress note    Subjective   Patient presented complaining of rupture of membranes. Negative evaluation. Reassuring fetal status.     Objective   Min/max vitals past 24 hours:  Stable vital in epic flowsheet     FHT's: reactive and category 1.  external monitors used   Cervix: was checked (by RN): 0 cm / 10 % / -3   Contractions: none      Assessment   1. IUP at 28w3d  2. No evidence of rupture of membranes  3. Reassuring fetal surveillance     Plan   1. OK to discharge home    Brisa Ding MD  2021  23:50 CDT

## 2021-07-26 ENCOUNTER — HOSPITAL ENCOUNTER (OUTPATIENT)
Facility: HOSPITAL | Age: 26
Discharge: HOME | End: 2021-07-26
Attending: OBSTETRICS & GYNECOLOGY | Admitting: OBSTETRICS & GYNECOLOGY

## 2021-07-26 VITALS
RESPIRATION RATE: 18 BRPM | SYSTOLIC BLOOD PRESSURE: 132 MMHG | TEMPERATURE: 97.6 F | OXYGEN SATURATION: 98 % | HEART RATE: 92 BPM | DIASTOLIC BLOOD PRESSURE: 72 MMHG

## 2021-07-26 LAB
BILIRUB UR QL STRIP: NEGATIVE
CLARITY UR: CLEAR
CLUE CELLS SPEC QL WET PREP: ABNORMAL
COLOR UR: YELLOW
GLUCOSE UR STRIP-MCNC: NEGATIVE MG/DL
HGB UR QL STRIP.AUTO: NEGATIVE
HYDATID CYST SPEC WET PREP: ABNORMAL
KETONES UR QL STRIP: NEGATIVE
LEUKOCYTE ESTERASE UR QL STRIP.AUTO: NEGATIVE
NITRITE UR QL STRIP: NEGATIVE
PH UR STRIP.AUTO: 7.5 [PH] (ref 5–8)
PROT UR QL STRIP: NEGATIVE
SP GR UR STRIP: <=1.005 (ref 1–1.03)
T VAGINALIS SPEC QL WET PREP: ABNORMAL
UROBILINOGEN UR QL STRIP: NORMAL
WBC SPEC QL WET PREP: ABNORMAL
YEAST GENITAL QL WET PREP: ABNORMAL

## 2021-07-26 PROCEDURE — 96365 THER/PROPH/DIAG IV INF INIT: CPT

## 2021-07-26 PROCEDURE — G0378 HOSPITAL OBSERVATION PER HR: HCPCS

## 2021-07-26 PROCEDURE — 25010000002 TERBUTALINE PER 1 MG: Performed by: OBSTETRICS & GYNECOLOGY

## 2021-07-26 PROCEDURE — 96372 THER/PROPH/DIAG INJ SC/IM: CPT

## 2021-07-26 PROCEDURE — 25010000002 BETAMETHASONE ACET & SOD PHOS PER 4 MG: Performed by: OBSTETRICS & GYNECOLOGY

## 2021-07-26 PROCEDURE — 87210 SMEAR WET MOUNT SALINE/INK: CPT | Performed by: OBSTETRICS & GYNECOLOGY

## 2021-07-26 PROCEDURE — 96367 TX/PROPH/DG ADDL SEQ IV INF: CPT

## 2021-07-26 PROCEDURE — 81003 URINALYSIS AUTO W/O SCOPE: CPT | Performed by: OBSTETRICS & GYNECOLOGY

## 2021-07-26 PROCEDURE — G0463 HOSPITAL OUTPT CLINIC VISIT: HCPCS

## 2021-07-26 PROCEDURE — 25010000002 PENICILLIN G POTASSIUM PER 600000 UNITS: Performed by: OBSTETRICS & GYNECOLOGY

## 2021-07-26 RX ORDER — BETAMETHASONE SODIUM PHOSPHATE AND BETAMETHASONE ACETATE 3; 3 MG/ML; MG/ML
12 INJECTION, SUSPENSION INTRA-ARTICULAR; INTRALESIONAL; INTRAMUSCULAR; SOFT TISSUE EVERY 24 HOURS
Status: DISCONTINUED | OUTPATIENT
Start: 2021-07-26 | End: 2021-07-26 | Stop reason: HOSPADM

## 2021-07-26 RX ORDER — SODIUM CHLORIDE 0.9 % (FLUSH) 0.9 %
10 SYRINGE (ML) INJECTION EVERY 12 HOURS SCHEDULED
Status: DISCONTINUED | OUTPATIENT
Start: 2021-07-26 | End: 2021-07-26 | Stop reason: HOSPADM

## 2021-07-26 RX ORDER — TERBUTALINE SULFATE 1 MG/ML
0.25 INJECTION, SOLUTION SUBCUTANEOUS ONCE
Status: COMPLETED | OUTPATIENT
Start: 2021-07-26 | End: 2021-07-26

## 2021-07-26 RX ORDER — PENICILLIN G POTASSIUM 5000000 [IU]/1
5 INJECTION, POWDER, FOR SOLUTION INTRAMUSCULAR; INTRAVENOUS ONCE
Status: DISCONTINUED | OUTPATIENT
Start: 2021-07-26 | End: 2021-07-26 | Stop reason: SDUPTHER

## 2021-07-26 RX ORDER — FERROUS SULFATE 325(65) MG
325 TABLET ORAL
COMMUNITY

## 2021-07-26 RX ORDER — SODIUM CHLORIDE 0.9 % (FLUSH) 0.9 %
10 SYRINGE (ML) INJECTION AS NEEDED
Status: DISCONTINUED | OUTPATIENT
Start: 2021-07-26 | End: 2021-07-26 | Stop reason: HOSPADM

## 2021-07-26 RX ADMIN — SODIUM CHLORIDE 5 MILLION UNITS: 900 INJECTION INTRAVENOUS at 19:07

## 2021-07-26 RX ADMIN — BETAMETHASONE ACETATE AND BETAMETHASONE SODIUM PHOSPHATE 12 MG: 3; 3 INJECTION, SUSPENSION INTRA-ARTICULAR; INTRALESIONAL; INTRAMUSCULAR; SOFT TISSUE at 18:28

## 2021-07-26 RX ADMIN — TERBUTALINE SULFATE 0.25 MG: 1 INJECTION SUBCUTANEOUS at 17:19

## 2021-07-26 RX ADMIN — METRONIDAZOLE 500 MG: 500 INJECTION, SOLUTION INTRAVENOUS at 20:04

## 2021-07-26 RX ADMIN — SODIUM CHLORIDE, POTASSIUM CHLORIDE, SODIUM LACTATE AND CALCIUM CHLORIDE 1000 ML: 600; 310; 30; 20 INJECTION, SOLUTION INTRAVENOUS at 17:15

## 2021-07-26 NOTE — PROGRESS NOTES
Venkata Avila  : 1995  MRN: [id number]  CSN: [id number]    Labor progress note    Subjective   Patient presents complaining of cramping and rectal pressure. Regular contractions noted.      Objective   Min/max vitals past 24 hours:  No data recorded   BP  Min: 129/68  Max: 129/68   Pulse  Min: 114  Max: 114   Resp  Min: 18  Max: 18        FHT's: reactive and category 1.  external monitors used   Cervix: was checked (by RN): 1 cm / 40 % / -3   Contractions: regular      Assessment   1. IUP at 34w4d  2.  contractions  3. History of  birth     Plan   1. Terbutaline   2. Steroids for lung maturation  3. Hydration  4. Wet prep/cath UA  5. GBS prophylaxis    Brisa Ding MD  2021  18:34 CDT

## 2021-07-27 ENCOUNTER — HOSPITAL ENCOUNTER (OUTPATIENT)
Facility: HOSPITAL | Age: 26
Discharge: HOME | End: 2021-07-27
Attending: OBSTETRICS & GYNECOLOGY | Admitting: OBSTETRICS & GYNECOLOGY

## 2021-07-27 VITALS
SYSTOLIC BLOOD PRESSURE: 126 MMHG | HEART RATE: 97 BPM | RESPIRATION RATE: 18 BRPM | TEMPERATURE: 97.5 F | DIASTOLIC BLOOD PRESSURE: 56 MMHG

## 2021-07-27 PROCEDURE — 25010000002 BETAMETHASONE ACET & SOD PHOS PER 4 MG: Performed by: OBSTETRICS & GYNECOLOGY

## 2021-07-27 PROCEDURE — G0378 HOSPITAL OBSERVATION PER HR: HCPCS

## 2021-07-27 PROCEDURE — G0463 HOSPITAL OUTPT CLINIC VISIT: HCPCS

## 2021-07-27 PROCEDURE — 96372 THER/PROPH/DIAG INJ SC/IM: CPT

## 2021-07-27 PROCEDURE — 59025 FETAL NON-STRESS TEST: CPT

## 2021-07-27 RX ORDER — BETAMETHASONE SODIUM PHOSPHATE AND BETAMETHASONE ACETATE 3; 3 MG/ML; MG/ML
12 INJECTION, SUSPENSION INTRA-ARTICULAR; INTRALESIONAL; INTRAMUSCULAR; SOFT TISSUE ONCE
Status: COMPLETED | OUTPATIENT
Start: 2021-07-27 | End: 2021-07-27

## 2021-07-27 RX ADMIN — METRONIDAZOLE 500 MG: 500 INJECTION, SOLUTION INTRAVENOUS at 18:51

## 2021-07-27 RX ADMIN — BETAMETHASONE ACETATE AND BETAMETHASONE SODIUM PHOSPHATE 12 MG: 3; 3 INJECTION, SUSPENSION INTRA-ARTICULAR; INTRALESIONAL; INTRAMUSCULAR; SOFT TISSUE at 18:42

## 2021-07-28 NOTE — NON STRESS TEST
Akilah Laura Avila, a  at 34w5d with an ELSA of 2021, by Patient Reported, was seen at [facility] LABOR DELIVERY for a nonstress test.    Chief Complaint   Patient presents with    Non-stress Test     second dose of BMT and Flagyl       Patient Active Problem List   Diagnosis   (none) - all problems resolved or deleted       Start Time: 1846  Stop Time: 1906       Interpretation   Nonstress Test Interpretation : Reactive   2021, Dr. Brisa Ding

## 2021-08-13 ENCOUNTER — HOSPITAL ENCOUNTER (OUTPATIENT)
Facility: HOSPITAL | Age: 26
Setting detail: OBSERVATION
Discharge: HOME | End: 2021-08-14
Attending: OBSTETRICS & GYNECOLOGY | Admitting: OBSTETRICS & GYNECOLOGY

## 2021-08-13 LAB
ABO GROUP BLD: NORMAL
BLD GP AB SCN SERPL QL: NEGATIVE
DEPRECATED RDW RBC AUTO: 44 FL (ref 37–54)
ERYTHROCYTE [DISTWIDTH] IN BLOOD BY AUTOMATED COUNT: 14.5 % (ref 12.3–15.4)
EXPIRATION DATE: NORMAL
HCT VFR BLD AUTO: 34.4 % (ref 34–46.6)
HGB BLD-MCNC: 11.9 G/DL (ref 12–15.9)
Lab: 5015
MCH RBC QN AUTO: 29 PG (ref 26.6–33)
MCHC RBC AUTO-ENTMCNC: 34.6 G/DL (ref 31.5–35.7)
MCV RBC AUTO: 83.7 FL (ref 79–97)
PLATELET # BLD AUTO: 345 10*3/MM3 (ref 140–450)
PMV BLD AUTO: 9.8 FL (ref 6–12)
PROT UR STRIP-MCNC: NEGATIVE MG/DL
RBC # BLD AUTO: 4.11 10*6/MM3 (ref 3.77–5.28)
RH BLD: POSITIVE
T&S EXPIRATION DATE: NORMAL
WBC # BLD AUTO: 17.44 10*3/MM3 (ref 3.4–10.8)

## 2021-08-13 PROCEDURE — 85027 COMPLETE CBC AUTOMATED: CPT | Performed by: OBSTETRICS & GYNECOLOGY

## 2021-08-13 PROCEDURE — G0378 HOSPITAL OBSERVATION PER HR: HCPCS

## 2021-08-13 PROCEDURE — 86900 BLOOD TYPING SEROLOGIC ABO: CPT | Performed by: OBSTETRICS & GYNECOLOGY

## 2021-08-13 PROCEDURE — 86901 BLOOD TYPING SEROLOGIC RH(D): CPT | Performed by: OBSTETRICS & GYNECOLOGY

## 2021-08-13 PROCEDURE — 96365 THER/PROPH/DIAG IV INF INIT: CPT

## 2021-08-13 PROCEDURE — 86850 RBC ANTIBODY SCREEN: CPT | Performed by: OBSTETRICS & GYNECOLOGY

## 2021-08-13 PROCEDURE — 25010000002 PENICILLIN G POTASSIUM PER 600000 UNITS: Performed by: OBSTETRICS & GYNECOLOGY

## 2021-08-13 PROCEDURE — 81002 URINALYSIS NONAUTO W/O SCOPE: CPT | Performed by: OBSTETRICS & GYNECOLOGY

## 2021-08-13 PROCEDURE — 96361 HYDRATE IV INFUSION ADD-ON: CPT

## 2021-08-13 RX ORDER — SODIUM CHLORIDE 0.9 % (FLUSH) 0.9 %
10 SYRINGE (ML) INJECTION AS NEEDED
Status: DISCONTINUED | OUTPATIENT
Start: 2021-08-13 | End: 2021-08-14 | Stop reason: HOSPADM

## 2021-08-13 RX ORDER — SODIUM CHLORIDE, SODIUM LACTATE, POTASSIUM CHLORIDE, CALCIUM CHLORIDE 600; 310; 30; 20 MG/100ML; MG/100ML; MG/100ML; MG/100ML
125 INJECTION, SOLUTION INTRAVENOUS CONTINUOUS
Status: DISCONTINUED | OUTPATIENT
Start: 2021-08-13 | End: 2021-08-14 | Stop reason: HOSPADM

## 2021-08-13 RX ORDER — SODIUM CHLORIDE 0.9 % (FLUSH) 0.9 %
10 SYRINGE (ML) INJECTION EVERY 12 HOURS SCHEDULED
Status: DISCONTINUED | OUTPATIENT
Start: 2021-08-13 | End: 2021-08-14 | Stop reason: HOSPADM

## 2021-08-13 RX ORDER — SODIUM CHLORIDE, SODIUM LACTATE, POTASSIUM CHLORIDE, CALCIUM CHLORIDE 600; 310; 30; 20 MG/100ML; MG/100ML; MG/100ML; MG/100ML
1000 INJECTION, SOLUTION INTRAVENOUS ONCE
Status: COMPLETED | OUTPATIENT
Start: 2021-08-13 | End: 2021-08-13

## 2021-08-13 RX ORDER — PENICILLIN G 3000000 [IU]/50ML
3 INJECTION, SOLUTION INTRAVENOUS EVERY 4 HOURS
Status: DISCONTINUED | OUTPATIENT
Start: 2021-08-14 | End: 2021-08-14 | Stop reason: HOSPADM

## 2021-08-13 RX ADMIN — SODIUM CHLORIDE, POTASSIUM CHLORIDE, SODIUM LACTATE AND CALCIUM CHLORIDE 125 ML/HR: 600; 310; 30; 20 INJECTION, SOLUTION INTRAVENOUS at 21:20

## 2021-08-13 RX ADMIN — SODIUM CHLORIDE 5 MILLION UNITS: 900 INJECTION INTRAVENOUS at 21:20

## 2021-08-13 RX ADMIN — SODIUM CHLORIDE, POTASSIUM CHLORIDE, SODIUM LACTATE AND CALCIUM CHLORIDE 1000 ML/HR: 600; 310; 30; 20 INJECTION, SOLUTION INTRAVENOUS at 20:43

## 2021-08-14 VITALS
TEMPERATURE: 97.8 F | RESPIRATION RATE: 20 BRPM | HEIGHT: 62 IN | HEART RATE: 85 BPM | BODY MASS INDEX: 43.54 KG/M2 | SYSTOLIC BLOOD PRESSURE: 130 MMHG | WEIGHT: 236.6 LBS | DIASTOLIC BLOOD PRESSURE: 73 MMHG

## 2021-08-14 LAB
BILIRUB UR QL STRIP: NEGATIVE
CLARITY UR: CLEAR
COLOR UR: YELLOW
GLUCOSE UR STRIP-MCNC: NEGATIVE MG/DL
HGB UR QL STRIP.AUTO: NEGATIVE
KETONES UR QL STRIP: NEGATIVE
LEUKOCYTE ESTERASE UR QL STRIP.AUTO: NEGATIVE
NITRITE UR QL STRIP: NEGATIVE
PH UR STRIP.AUTO: 7 [PH] (ref 5–8)
PROT UR QL STRIP: NEGATIVE
SP GR UR STRIP: 1.01 (ref 1–1.03)
UROBILINOGEN UR QL STRIP: NORMAL

## 2021-08-14 PROCEDURE — 25010000002 PENICILLIN G POTASSIUM PER 600000 UNITS: Performed by: OBSTETRICS & GYNECOLOGY

## 2021-08-14 PROCEDURE — G0378 HOSPITAL OBSERVATION PER HR: HCPCS

## 2021-08-14 PROCEDURE — 87086 URINE CULTURE/COLONY COUNT: CPT | Performed by: OBSTETRICS & GYNECOLOGY

## 2021-08-14 PROCEDURE — 81003 URINALYSIS AUTO W/O SCOPE: CPT | Performed by: OBSTETRICS & GYNECOLOGY

## 2021-08-14 PROCEDURE — 96366 THER/PROPH/DIAG IV INF ADDON: CPT

## 2021-08-14 PROCEDURE — G0463 HOSPITAL OUTPT CLINIC VISIT: HCPCS

## 2021-08-14 RX ADMIN — PENICILLIN G 3 MILLION UNITS: 3000000 INJECTION, SOLUTION INTRAVENOUS at 05:25

## 2021-08-14 RX ADMIN — PENICILLIN G 3 MILLION UNITS: 3000000 INJECTION, SOLUTION INTRAVENOUS at 01:21

## 2021-08-14 RX ADMIN — SODIUM CHLORIDE, POTASSIUM CHLORIDE, SODIUM LACTATE AND CALCIUM CHLORIDE 125 ML/HR: 600; 310; 30; 20 INJECTION, SOLUTION INTRAVENOUS at 04:38

## 2021-08-15 ENCOUNTER — HOSPITAL ENCOUNTER (OUTPATIENT)
Facility: HOSPITAL | Age: 26
Discharge: HOME | End: 2021-08-15
Attending: OBSTETRICS & GYNECOLOGY | Admitting: OBSTETRICS & GYNECOLOGY

## 2021-08-15 VITALS
SYSTOLIC BLOOD PRESSURE: 137 MMHG | RESPIRATION RATE: 18 BRPM | BODY MASS INDEX: 43.43 KG/M2 | HEIGHT: 62 IN | HEART RATE: 93 BPM | DIASTOLIC BLOOD PRESSURE: 86 MMHG | TEMPERATURE: 98.2 F | WEIGHT: 236 LBS

## 2021-08-15 LAB
ALT SERPL W P-5'-P-CCNC: 11 U/L (ref 1–33)
AST SERPL-CCNC: 14 U/L (ref 1–32)
BACTERIA SPEC AEROBE CULT: NO GROWTH
BASOPHILS # BLD AUTO: 0.05 10*3/MM3 (ref 0–0.2)
BASOPHILS NFR BLD AUTO: 0.4 % (ref 0–1.5)
DEPRECATED RDW RBC AUTO: 44.3 FL (ref 37–54)
EOSINOPHIL # BLD AUTO: 0.19 10*3/MM3 (ref 0–0.4)
EOSINOPHIL NFR BLD AUTO: 1.3 % (ref 0.3–6.2)
ERYTHROCYTE [DISTWIDTH] IN BLOOD BY AUTOMATED COUNT: 14.4 % (ref 12.3–15.4)
HCT VFR BLD AUTO: 32.9 % (ref 34–46.6)
HGB BLD-MCNC: 11.5 G/DL (ref 12–15.9)
IMM GRANULOCYTES # BLD AUTO: 0.25 10*3/MM3 (ref 0–0.05)
IMM GRANULOCYTES NFR BLD AUTO: 1.8 % (ref 0–0.5)
LDH SERPL-CCNC: 122 U/L (ref 135–214)
LYMPHOCYTES # BLD AUTO: 1.77 10*3/MM3 (ref 0.7–3.1)
LYMPHOCYTES NFR BLD AUTO: 12.4 % (ref 19.6–45.3)
MCH RBC QN AUTO: 29.5 PG (ref 26.6–33)
MCHC RBC AUTO-ENTMCNC: 35 G/DL (ref 31.5–35.7)
MCV RBC AUTO: 84.4 FL (ref 79–97)
MONOCYTES # BLD AUTO: 0.71 10*3/MM3 (ref 0.1–0.9)
MONOCYTES NFR BLD AUTO: 5 % (ref 5–12)
NEUTROPHILS NFR BLD AUTO: 11.29 10*3/MM3 (ref 1.7–7)
NEUTROPHILS NFR BLD AUTO: 79.1 % (ref 42.7–76)
NRBC BLD AUTO-RTO: 0 /100 WBC (ref 0–0.2)
PLATELET # BLD AUTO: 301 10*3/MM3 (ref 140–450)
PMV BLD AUTO: 9.7 FL (ref 6–12)
PROT 24H UR-MRATE: 144.4 MG/24HOURS (ref 0–150)
RBC # BLD AUTO: 3.9 10*6/MM3 (ref 3.77–5.28)
URATE SERPL-MCNC: 4.2 MG/DL (ref 2.4–5.7)
WBC # BLD AUTO: 14.26 10*3/MM3 (ref 3.4–10.8)

## 2021-08-15 PROCEDURE — 84156 ASSAY OF PROTEIN URINE: CPT | Performed by: OBSTETRICS & GYNECOLOGY

## 2021-08-15 PROCEDURE — 59025 FETAL NON-STRESS TEST: CPT

## 2021-08-15 PROCEDURE — G0463 HOSPITAL OUTPT CLINIC VISIT: HCPCS

## 2021-08-15 PROCEDURE — 84550 ASSAY OF BLOOD/URIC ACID: CPT | Performed by: OBSTETRICS & GYNECOLOGY

## 2021-08-15 PROCEDURE — 85025 COMPLETE CBC W/AUTO DIFF WBC: CPT | Performed by: OBSTETRICS & GYNECOLOGY

## 2021-08-15 PROCEDURE — G0378 HOSPITAL OBSERVATION PER HR: HCPCS

## 2021-08-15 PROCEDURE — 81050 URINALYSIS VOLUME MEASURE: CPT | Performed by: OBSTETRICS & GYNECOLOGY

## 2021-08-15 PROCEDURE — 83615 LACTATE (LD) (LDH) ENZYME: CPT | Performed by: OBSTETRICS & GYNECOLOGY

## 2021-08-15 PROCEDURE — 84450 TRANSFERASE (AST) (SGOT): CPT | Performed by: OBSTETRICS & GYNECOLOGY

## 2021-08-15 PROCEDURE — 36415 COLL VENOUS BLD VENIPUNCTURE: CPT | Performed by: OBSTETRICS & GYNECOLOGY

## 2021-08-15 PROCEDURE — 84460 ALANINE AMINO (ALT) (SGPT): CPT | Performed by: OBSTETRICS & GYNECOLOGY

## 2021-08-15 NOTE — NURSING NOTE
PIH labs and 24 hour urine WNL. NST reactive, 1 min decel reported to MD at 1413 after pt returned from using restroom. Watched pt after for additional 15 min no additional decels noted. Fetal movement noted by patient.

## 2021-08-15 NOTE — NON STRESS TEST
Akilah Laura Avila, a  at 37w3d with an ELSA of 2021, by Patient Reported, was seen at [facility] LABOR DELIVERY for a nonstress test.    Chief Complaint   Patient presents with    H labs, 24 hour urine       Patient Active Problem List   Diagnosis   (none) - all problems resolved or deleted       Start Time: 1328  Stop Time: 1348         Interpretation   Nonstress Test Interpretation : Reactive   08/15/2021, Dr. Brisa Ding

## 2021-08-24 ENCOUNTER — ANESTHESIA EVENT (OUTPATIENT)
Dept: LABOR AND DELIVERY | Facility: HOSPITAL | Age: 26
End: 2021-08-24

## 2021-08-24 ENCOUNTER — HOSPITAL ENCOUNTER (INPATIENT)
Facility: HOSPITAL | Age: 26
LOS: 2 days | Discharge: HOME | End: 2021-08-26
Attending: OBSTETRICS & GYNECOLOGY | Admitting: OBSTETRICS & GYNECOLOGY

## 2021-08-24 ENCOUNTER — ANESTHESIA (OUTPATIENT)
Dept: LABOR AND DELIVERY | Facility: HOSPITAL | Age: 26
End: 2021-08-24

## 2021-08-24 DIAGNOSIS — Z37.9 NORMAL LABOR: Primary | ICD-10-CM

## 2021-08-24 PROBLEM — Z34.90 PREGNANCY: Status: RESOLVED | Noted: 2020-05-01 | Resolved: 2021-08-24

## 2021-08-24 LAB
ABO GROUP BLD: NORMAL
BLD GP AB SCN SERPL QL: NEGATIVE
DEPRECATED RDW RBC AUTO: 43.8 FL (ref 37–54)
EOSINOPHIL # BLD MANUAL: 0.19 10*3/MM3 (ref 0–0.4)
EOSINOPHIL NFR BLD MANUAL: 1 % (ref 0.3–6.2)
ERYTHROCYTE [DISTWIDTH] IN BLOOD BY AUTOMATED COUNT: 14.4 % (ref 12.3–15.4)
HCT VFR BLD AUTO: 36.3 % (ref 34–46.6)
HGB BLD-MCNC: 12.5 G/DL (ref 12–15.9)
LYMPHOCYTES # BLD MANUAL: 2.43 10*3/MM3 (ref 0.7–3.1)
LYMPHOCYTES NFR BLD MANUAL: 13 % (ref 19.6–45.3)
LYMPHOCYTES NFR BLD MANUAL: 3 % (ref 5–12)
MCH RBC QN AUTO: 28.8 PG (ref 26.6–33)
MCHC RBC AUTO-ENTMCNC: 34.4 G/DL (ref 31.5–35.7)
MCV RBC AUTO: 83.6 FL (ref 79–97)
MONOCYTES # BLD AUTO: 0.56 10*3/MM3 (ref 0.1–0.9)
MYELOCYTES NFR BLD MANUAL: 1 % (ref 0–0)
NEUTROPHILS # BLD AUTO: 15.33 10*3/MM3 (ref 1.7–7)
NEUTROPHILS NFR BLD MANUAL: 82 % (ref 42.7–76)
PLAT MORPH BLD: NORMAL
PLATELET # BLD AUTO: 403 10*3/MM3 (ref 140–450)
PMV BLD AUTO: 10 FL (ref 6–12)
RBC # BLD AUTO: 4.34 10*6/MM3 (ref 3.77–5.28)
RBC MORPH BLD: NORMAL
RH BLD: POSITIVE
SARS-COV-2 RNA PNL SPEC NAA+PROBE: NOT DETECTED
T&S EXPIRATION DATE: NORMAL
WBC # BLD AUTO: 18.7 10*3/MM3 (ref 3.4–10.8)
WBC MORPH BLD: NORMAL

## 2021-08-24 PROCEDURE — 3E033VJ INTRODUCTION OF OTHER HORMONE INTO PERIPHERAL VEIN, PERCUTANEOUS APPROACH: ICD-10-PCS | Performed by: OBSTETRICS & GYNECOLOGY

## 2021-08-24 PROCEDURE — 85007 BL SMEAR W/DIFF WBC COUNT: CPT | Performed by: OBSTETRICS & GYNECOLOGY

## 2021-08-24 PROCEDURE — 51702 INSERT TEMP BLADDER CATH: CPT

## 2021-08-24 PROCEDURE — 25010000002 BUTORPHANOL PER 1 MG: Performed by: OBSTETRICS & GYNECOLOGY

## 2021-08-24 PROCEDURE — 86900 BLOOD TYPING SEROLOGIC ABO: CPT | Performed by: OBSTETRICS & GYNECOLOGY

## 2021-08-24 PROCEDURE — C1755 CATHETER, INTRASPINAL: HCPCS | Performed by: NURSE ANESTHETIST, CERTIFIED REGISTERED

## 2021-08-24 PROCEDURE — 25010000002 ROPIVACAINE PER 1 MG: Performed by: NURSE ANESTHETIST, CERTIFIED REGISTERED

## 2021-08-24 PROCEDURE — 87635 SARS-COV-2 COVID-19 AMP PRB: CPT | Performed by: OBSTETRICS & GYNECOLOGY

## 2021-08-24 PROCEDURE — 25010000002 PENICILLIN G POTASSIUM PER 600000 UNITS: Performed by: OBSTETRICS & GYNECOLOGY

## 2021-08-24 PROCEDURE — 85027 COMPLETE CBC AUTOMATED: CPT | Performed by: OBSTETRICS & GYNECOLOGY

## 2021-08-24 PROCEDURE — 88307 TISSUE EXAM BY PATHOLOGIST: CPT | Performed by: OBSTETRICS & GYNECOLOGY

## 2021-08-24 PROCEDURE — 86850 RBC ANTIBODY SCREEN: CPT | Performed by: OBSTETRICS & GYNECOLOGY

## 2021-08-24 PROCEDURE — 0UQGXZZ REPAIR VAGINA, EXTERNAL APPROACH: ICD-10-PCS | Performed by: OBSTETRICS & GYNECOLOGY

## 2021-08-24 PROCEDURE — 86901 BLOOD TYPING SEROLOGIC RH(D): CPT | Performed by: OBSTETRICS & GYNECOLOGY

## 2021-08-24 RX ORDER — OXYCODONE HYDROCHLORIDE AND ACETAMINOPHEN 5; 325 MG/1; MG/1
1 TABLET ORAL EVERY 6 HOURS PRN
Status: DISCONTINUED | OUTPATIENT
Start: 2021-08-24 | End: 2021-08-26 | Stop reason: HOSPADM

## 2021-08-24 RX ORDER — ONDANSETRON 2 MG/ML
4 INJECTION INTRAMUSCULAR; INTRAVENOUS EVERY 6 HOURS PRN
Status: DISCONTINUED | OUTPATIENT
Start: 2021-08-24 | End: 2021-08-26 | Stop reason: HOSPADM

## 2021-08-24 RX ORDER — OXYTOCIN/0.9 % SODIUM CHLORIDE 30/500 ML
125 PLASTIC BAG, INJECTION (ML) INTRAVENOUS CONTINUOUS PRN
Status: COMPLETED | OUTPATIENT
Start: 2021-08-24 | End: 2021-08-24

## 2021-08-24 RX ORDER — METHYLERGONOVINE MALEATE 0.2 MG/ML
200 INJECTION INTRAVENOUS ONCE AS NEEDED
Status: DISCONTINUED | OUTPATIENT
Start: 2021-08-24 | End: 2021-08-24 | Stop reason: HOSPADM

## 2021-08-24 RX ORDER — HYDROCORTISONE 25 MG/G
1 CREAM TOPICAL AS NEEDED
Status: DISCONTINUED | OUTPATIENT
Start: 2021-08-24 | End: 2021-08-26 | Stop reason: HOSPADM

## 2021-08-24 RX ORDER — ONDANSETRON 2 MG/ML
4 INJECTION INTRAMUSCULAR; INTRAVENOUS EVERY 6 HOURS PRN
Status: DISCONTINUED | OUTPATIENT
Start: 2021-08-24 | End: 2021-08-24 | Stop reason: HOSPADM

## 2021-08-24 RX ORDER — PROMETHAZINE HYDROCHLORIDE 12.5 MG/1
12.5 SUPPOSITORY RECTAL EVERY 6 HOURS PRN
Status: DISCONTINUED | OUTPATIENT
Start: 2021-08-24 | End: 2021-08-24 | Stop reason: HOSPADM

## 2021-08-24 RX ORDER — DOCUSATE SODIUM 100 MG/1
100 CAPSULE, LIQUID FILLED ORAL 2 TIMES DAILY
Status: DISCONTINUED | OUTPATIENT
Start: 2021-08-24 | End: 2021-08-26 | Stop reason: HOSPADM

## 2021-08-24 RX ORDER — OXYTOCIN/0.9 % SODIUM CHLORIDE 30/500 ML
999 PLASTIC BAG, INJECTION (ML) INTRAVENOUS ONCE
Status: COMPLETED | OUTPATIENT
Start: 2021-08-24 | End: 2021-08-24

## 2021-08-24 RX ORDER — BISACODYL 10 MG
10 SUPPOSITORY, RECTAL RECTAL DAILY PRN
Status: DISCONTINUED | OUTPATIENT
Start: 2021-08-25 | End: 2021-08-26 | Stop reason: HOSPADM

## 2021-08-24 RX ORDER — OXYTOCIN/0.9 % SODIUM CHLORIDE 30/500 ML
2-20 PLASTIC BAG, INJECTION (ML) INTRAVENOUS
Status: DISCONTINUED | OUTPATIENT
Start: 2021-08-24 | End: 2021-08-24

## 2021-08-24 RX ORDER — TRISODIUM CITRATE DIHYDRATE AND CITRIC ACID MONOHYDRATE 500; 334 MG/5ML; MG/5ML
30 SOLUTION ORAL ONCE
Status: DISCONTINUED | OUTPATIENT
Start: 2021-08-24 | End: 2021-08-24 | Stop reason: HOSPADM

## 2021-08-24 RX ORDER — SODIUM CHLORIDE, SODIUM LACTATE, POTASSIUM CHLORIDE, CALCIUM CHLORIDE 600; 310; 30; 20 MG/100ML; MG/100ML; MG/100ML; MG/100ML
125 INJECTION, SOLUTION INTRAVENOUS CONTINUOUS
Status: DISCONTINUED | OUTPATIENT
Start: 2021-08-24 | End: 2021-08-24

## 2021-08-24 RX ORDER — CARBOPROST TROMETHAMINE 250 UG/ML
250 INJECTION, SOLUTION INTRAMUSCULAR
Status: DISCONTINUED | OUTPATIENT
Start: 2021-08-24 | End: 2021-08-24 | Stop reason: HOSPADM

## 2021-08-24 RX ORDER — PROMETHAZINE HYDROCHLORIDE 25 MG/1
25 TABLET ORAL EVERY 6 HOURS PRN
Status: DISCONTINUED | OUTPATIENT
Start: 2021-08-24 | End: 2021-08-26 | Stop reason: HOSPADM

## 2021-08-24 RX ORDER — IBUPROFEN 800 MG/1
800 TABLET ORAL EVERY 8 HOURS PRN
Status: DISCONTINUED | OUTPATIENT
Start: 2021-08-24 | End: 2021-08-26 | Stop reason: HOSPADM

## 2021-08-24 RX ORDER — FAMOTIDINE 10 MG/ML
20 INJECTION, SOLUTION INTRAVENOUS ONCE AS NEEDED
Status: CANCELLED | OUTPATIENT
Start: 2021-08-24

## 2021-08-24 RX ORDER — CARBOPROST TROMETHAMINE 250 UG/ML
250 INJECTION, SOLUTION INTRAMUSCULAR ONCE AS NEEDED
Status: DISCONTINUED | OUTPATIENT
Start: 2021-08-24 | End: 2021-08-26 | Stop reason: HOSPADM

## 2021-08-24 RX ORDER — PROMETHAZINE HYDROCHLORIDE 12.5 MG/1
12.5 SUPPOSITORY RECTAL EVERY 6 HOURS PRN
Status: DISCONTINUED | OUTPATIENT
Start: 2021-08-24 | End: 2021-08-26 | Stop reason: HOSPADM

## 2021-08-24 RX ORDER — EPHEDRINE SULFATE 50 MG/ML
10 INJECTION, SOLUTION INTRAVENOUS
Status: DISCONTINUED | OUTPATIENT
Start: 2021-08-24 | End: 2021-08-24 | Stop reason: HOSPADM

## 2021-08-24 RX ORDER — OXYTOCIN/0.9 % SODIUM CHLORIDE 30/500 ML
999 PLASTIC BAG, INJECTION (ML) INTRAVENOUS ONCE
Status: DISCONTINUED | OUTPATIENT
Start: 2021-08-24 | End: 2021-08-26 | Stop reason: HOSPADM

## 2021-08-24 RX ORDER — PRENATAL VIT/IRON FUM/FOLIC AC 27MG-0.8MG
1 TABLET ORAL DAILY
Status: DISCONTINUED | OUTPATIENT
Start: 2021-08-25 | End: 2021-08-26 | Stop reason: HOSPADM

## 2021-08-24 RX ORDER — METHYLERGONOVINE MALEATE 0.2 MG/ML
200 INJECTION INTRAVENOUS ONCE AS NEEDED
Status: DISCONTINUED | OUTPATIENT
Start: 2021-08-24 | End: 2021-08-26 | Stop reason: HOSPADM

## 2021-08-24 RX ORDER — PENICILLIN G 3000000 [IU]/50ML
3 INJECTION, SOLUTION INTRAVENOUS EVERY 4 HOURS
Status: DISCONTINUED | OUTPATIENT
Start: 2021-08-24 | End: 2021-08-24 | Stop reason: HOSPADM

## 2021-08-24 RX ORDER — ROPIVACAINE HYDROCHLORIDE 2 MG/ML
INJECTION, SOLUTION EPIDURAL; INFILTRATION; PERINEURAL AS NEEDED
Status: DISCONTINUED | OUTPATIENT
Start: 2021-08-24 | End: 2021-08-25 | Stop reason: SURG

## 2021-08-24 RX ORDER — MISOPROSTOL 200 UG/1
600 TABLET ORAL ONCE AS NEEDED
Status: DISCONTINUED | OUTPATIENT
Start: 2021-08-24 | End: 2021-08-26 | Stop reason: HOSPADM

## 2021-08-24 RX ORDER — OXYTOCIN/0.9 % SODIUM CHLORIDE 30/500 ML
250 PLASTIC BAG, INJECTION (ML) INTRAVENOUS CONTINUOUS
Status: ACTIVE | OUTPATIENT
Start: 2021-08-24 | End: 2021-08-24

## 2021-08-24 RX ORDER — BUTORPHANOL TARTRATE 1 MG/ML
1 INJECTION, SOLUTION INTRAMUSCULAR; INTRAVENOUS
Status: DISCONTINUED | OUTPATIENT
Start: 2021-08-24 | End: 2021-08-24

## 2021-08-24 RX ORDER — LIDOCAINE HYDROCHLORIDE AND EPINEPHRINE 15; 5 MG/ML; UG/ML
INJECTION, SOLUTION EPIDURAL
Status: COMPLETED | OUTPATIENT
Start: 2021-08-24 | End: 2021-08-24

## 2021-08-24 RX ORDER — OXYTOCIN/0.9 % SODIUM CHLORIDE 30/500 ML
125 PLASTIC BAG, INJECTION (ML) INTRAVENOUS CONTINUOUS PRN
Status: DISCONTINUED | OUTPATIENT
Start: 2021-08-24 | End: 2021-08-26 | Stop reason: HOSPADM

## 2021-08-24 RX ORDER — SODIUM CHLORIDE 0.9 % (FLUSH) 0.9 %
1-10 SYRINGE (ML) INJECTION AS NEEDED
Status: DISCONTINUED | OUTPATIENT
Start: 2021-08-24 | End: 2021-08-26 | Stop reason: HOSPADM

## 2021-08-24 RX ORDER — MISOPROSTOL 200 UG/1
800 TABLET ORAL ONCE AS NEEDED
Status: DISCONTINUED | OUTPATIENT
Start: 2021-08-24 | End: 2021-08-24 | Stop reason: HOSPADM

## 2021-08-24 RX ORDER — ONDANSETRON 4 MG/1
4 TABLET, FILM COATED ORAL EVERY 6 HOURS PRN
Status: DISCONTINUED | OUTPATIENT
Start: 2021-08-24 | End: 2021-08-24 | Stop reason: HOSPADM

## 2021-08-24 RX ORDER — IBUPROFEN 800 MG/1
800 TABLET ORAL EVERY 8 HOURS PRN
Status: DISCONTINUED | OUTPATIENT
Start: 2021-08-24 | End: 2021-08-24 | Stop reason: HOSPADM

## 2021-08-24 RX ORDER — PROMETHAZINE HYDROCHLORIDE 25 MG/1
12.5 TABLET ORAL EVERY 6 HOURS PRN
Status: DISCONTINUED | OUTPATIENT
Start: 2021-08-24 | End: 2021-08-24 | Stop reason: HOSPADM

## 2021-08-24 RX ORDER — ONDANSETRON 4 MG/1
4 TABLET, FILM COATED ORAL EVERY 6 HOURS PRN
Status: DISCONTINUED | OUTPATIENT
Start: 2021-08-24 | End: 2021-08-26 | Stop reason: HOSPADM

## 2021-08-24 RX ORDER — SODIUM CHLORIDE, SODIUM LACTATE, POTASSIUM CHLORIDE, CALCIUM CHLORIDE 600; 310; 30; 20 MG/100ML; MG/100ML; MG/100ML; MG/100ML
125 INJECTION, SOLUTION INTRAVENOUS CONTINUOUS
Status: DISCONTINUED | OUTPATIENT
Start: 2021-08-24 | End: 2021-08-26 | Stop reason: HOSPADM

## 2021-08-24 RX ADMIN — BUTORPHANOL TARTRATE 1 MG: 1 INJECTION, SOLUTION INTRAMUSCULAR; INTRAVENOUS at 11:43

## 2021-08-24 RX ADMIN — OXYTOCIN-SODIUM CHLORIDE 0.9% IV SOLN 30 UNIT/500ML 250 ML/HR: 30-0.9/5 SOLUTION at 17:02

## 2021-08-24 RX ADMIN — SODIUM CHLORIDE, POTASSIUM CHLORIDE, SODIUM LACTATE AND CALCIUM CHLORIDE 1000 ML: 600; 310; 30; 20 INJECTION, SOLUTION INTRAVENOUS at 15:02

## 2021-08-24 RX ADMIN — OXYTOCIN-SODIUM CHLORIDE 0.9% IV SOLN 30 UNIT/500ML 999 ML/HR: 30-0.9/5 SOLUTION at 16:51

## 2021-08-24 RX ADMIN — OXYTOCIN-SODIUM CHLORIDE 0.9% IV SOLN 30 UNIT/500ML 2 MILLI-UNITS/MIN: 30-0.9/5 SOLUTION at 10:52

## 2021-08-24 RX ADMIN — PENICILLIN G 3 MILLION UNITS: 3000000 INJECTION, SOLUTION INTRAVENOUS at 15:28

## 2021-08-24 RX ADMIN — SODIUM CHLORIDE, POTASSIUM CHLORIDE, SODIUM LACTATE AND CALCIUM CHLORIDE 125 ML/HR: 600; 310; 30; 20 INJECTION, SOLUTION INTRAVENOUS at 10:00

## 2021-08-24 RX ADMIN — DOCUSATE SODIUM 100 MG: 100 CAPSULE ORAL at 20:26

## 2021-08-24 RX ADMIN — SODIUM CHLORIDE, POTASSIUM CHLORIDE, SODIUM LACTATE AND CALCIUM CHLORIDE 999 ML/HR: 600; 310; 30; 20 INJECTION, SOLUTION INTRAVENOUS at 15:45

## 2021-08-24 RX ADMIN — ROPIVACAINE HYDROCHLORIDE 6 ML: 2 INJECTION, SOLUTION EPIDURAL; INFILTRATION at 15:37

## 2021-08-24 RX ADMIN — SODIUM CHLORIDE 5 MILLION UNITS: 9 INJECTION, SOLUTION INTRAVENOUS at 10:52

## 2021-08-24 RX ADMIN — LIDOCAINE HYDROCHLORIDE AND EPINEPHRINE 3 ML: 15; 5 INJECTION, SOLUTION EPIDURAL at 15:32

## 2021-08-24 RX ADMIN — BENZOCAINE AND LEVOMENTHOL: 200; 5 SPRAY TOPICAL at 23:01

## 2021-08-24 RX ADMIN — BUTORPHANOL TARTRATE 1 MG: 1 INJECTION, SOLUTION INTRAMUSCULAR; INTRAVENOUS at 13:22

## 2021-08-24 RX ADMIN — ROPIVACAINE HYDROCHLORIDE 6 ML/HR: 2 INJECTION, SOLUTION EPIDURAL; INFILTRATION at 15:43

## 2021-08-24 NOTE — ANESTHESIA PREPROCEDURE EVALUATION
Anesthesia Evaluation                  Airway   No difficulty expected  Dental      Pulmonary    (+) a smoker Current,   Cardiovascular         Neuro/Psych  (+) psychiatric history Depression and Bipolar,     GI/Hepatic/Renal/Endo      Musculoskeletal     Abdominal    Substance History - negative use     OB/GYN    (+) Pregnant, pregnancy induced hypertension        Other - negative ROS                       Anesthesia Plan    ASA 2     epidural       Anesthetic plan, all risks, benefits, and alternatives have been provided, discussed and informed consent has been obtained with: patient.

## 2021-08-24 NOTE — ANESTHESIA PROCEDURE NOTES
Labor Epidural      Preanesthetic Checklist  Completed: patient identified, IV checked, site marked, risks and benefits discussed, surgical consent, monitors and equipment checked, pre-op evaluation and timeout performed  Prep:  Sterile Tech:gloves, cap and sterile barrier  Monitoring:continuous pulse oximetry, EKG and blood pressure monitoring  Epidural Block Procedure:  Approach:midline  Guidance:landmark technique and palpation technique  Location:L4-L5  Needle Type:Tuohy  Needle Gauge:18 G  Loss of Resistance Medium: saline  Loss of Resistance: 7cm  Cath Depth at skin:12 cm  Paresthesia: none  Aspiration:negative  Test Dose:negative  Test dose medication: lidocaine 1.5%-EPINEPHrine 1:200,000 (XYLOCAINE W/EPI) injection, 3 mL  Med administered at 8/24/2021 3:32 PM  Number of Attempts: 1  Post Assessment:  Dressing:secured with tape and occlusive dressing applied  Pt Tolerance:patient tolerated the procedure well with no apparent complications  Complications:no

## 2021-08-25 LAB
HCT VFR BLD AUTO: 34 % (ref 34–46.6)
HGB BLD-MCNC: 11.6 G/DL (ref 12–15.9)

## 2021-08-25 PROCEDURE — 25010000002 ROPIVACAINE PER 1 MG: Performed by: NURSE ANESTHETIST, CERTIFIED REGISTERED

## 2021-08-25 PROCEDURE — 85018 HEMOGLOBIN: CPT | Performed by: OBSTETRICS & GYNECOLOGY

## 2021-08-25 PROCEDURE — 85014 HEMATOCRIT: CPT | Performed by: OBSTETRICS & GYNECOLOGY

## 2021-08-25 RX ADMIN — OXYCODONE HYDROCHLORIDE AND ACETAMINOPHEN 1 TABLET: 5; 325 TABLET ORAL at 13:08

## 2021-08-25 RX ADMIN — DOCUSATE SODIUM 100 MG: 100 CAPSULE ORAL at 08:21

## 2021-08-25 RX ADMIN — PRENATAL VIT W/ FE FUMARATE-FA TAB 27-0.8 MG 1 TABLET: 27-0.8 TAB at 08:21

## 2021-08-25 RX ADMIN — DOCUSATE SODIUM 100 MG: 100 CAPSULE ORAL at 20:29

## 2021-08-25 NOTE — ANESTHESIA POSTPROCEDURE EVALUATION
"Patient: Akilah Avila    Procedure Summary     Date: 08/24/21 Room / Location:     Anesthesia Start: 1519 Anesthesia Stop: 1648    Procedure: LABOR ANALGESIA Diagnosis:     Scheduled Providers:  Provider: Michael Grant CRNA    Anesthesia Type: epidural ASA Status: 2          Anesthesia Type: epidural    Vitals  Vitals Value Taken Time   /82 08/25/21 1306   Temp 97.9 °F (36.6 °C) 08/25/21 1306   Pulse 86 08/25/21 1306   Resp 18 08/25/21 1306   SpO2 98 % 08/25/21 1306           Post Anesthesia Care and Evaluation    Patient location during evaluation: floor  Patient participation: complete - patient participated  Level of consciousness: awake and alert  Pain management: adequate  Airway patency: patent  Anesthetic complications: No anesthetic complications    Cardiovascular status: acceptable  Respiratory status: acceptable  Hydration status: acceptable  Post Neuraxial Block status: Motor and sensory function returned to baseline and No signs or symptoms of PDPH  Comments: Blood pressure 132/82, pulse 86, temperature 97.9 °F (36.6 °C), temperature source Oral, resp. rate 18, height 157.5 cm (62\"), weight 109 kg (240 lb), last menstrual period 10/12/2020, SpO2 98 %, currently breastfeeding.    Pt discharged from PACU based on andriy score >8      "

## 2021-08-26 VITALS
WEIGHT: 240 LBS | DIASTOLIC BLOOD PRESSURE: 69 MMHG | SYSTOLIC BLOOD PRESSURE: 120 MMHG | HEIGHT: 62 IN | BODY MASS INDEX: 44.16 KG/M2 | OXYGEN SATURATION: 95 % | RESPIRATION RATE: 18 BRPM | HEART RATE: 87 BPM | TEMPERATURE: 98.1 F

## 2021-08-26 LAB
CYTO UR: NORMAL
LAB AP CASE REPORT: NORMAL
LAB AP CLINICAL INFORMATION: NORMAL
PATH REPORT.FINAL DX SPEC: NORMAL
PATH REPORT.GROSS SPEC: NORMAL

## 2021-08-26 RX ORDER — OXYCODONE HYDROCHLORIDE AND ACETAMINOPHEN 5; 325 MG/1; MG/1
1 TABLET ORAL EVERY 6 HOURS PRN
Qty: 10 TABLET | Refills: 0 | Status: SHIPPED | OUTPATIENT
Start: 2021-08-26 | End: 2021-09-03

## 2021-08-26 RX ORDER — IBUPROFEN 800 MG/1
800 TABLET ORAL EVERY 8 HOURS PRN
Qty: 90 TABLET | Refills: 2 | Status: SHIPPED | OUTPATIENT
Start: 2021-08-26

## 2021-08-26 RX ADMIN — OXYCODONE HYDROCHLORIDE AND ACETAMINOPHEN 1 TABLET: 5; 325 TABLET ORAL at 00:24

## 2021-08-28 ENCOUNTER — HOSPITAL ENCOUNTER (OUTPATIENT)
Dept: LACTATION | Facility: HOSPITAL | Age: 26
Discharge: HOME | End: 2021-08-28

## 2021-08-28 NOTE — LACTATION NOTE
"Mother's Name: Akilah Avila  Contact Number: [phone]  G/P:2/2  Breastfeeding Hx: first time breastfeeder  Significant Medical History: IUFD at 22w5d, HTN, Depression, Bipolar disorder  Maternal Breast Assessment: Filling without knots, small scab to left center nipple    Infant's Name:Beth \" Osmel\"  YOB: 2020  Gestational age at Birth:38w5d  Age:4 days  Physician: Dr. Matute                     Reason for Visit: initial follow up. Transfer check          Infant's Birth weight:6-7.7 (2940g)  Previous Weight:6-1.2 (2757g)  Wt Loss:-6.22%    Today's Weight:   6-2.3 (2787g) Wt Loss:-5.20%    Feeding History Since Discharge/Last Lactation Appt.: Infant normally requires mother to wake her every 3 hours for feedings. Infant nurses bilateral breasts approximately 10 mins on each side    Past 24 Hours Voids/Stools:  +6/6      Color of Stool:greenish    Pre Weight:6-2.9 (2804g)           Left Breast:      12 mins 6-4.7 (2856g) +28 ml              Right Breast:   7 mins  6-3.7 (2828g)       +24 ml               Total Minutes: 19 mins            Total Weight Gain:   +52       gms  AMAZING!!!!     Average Feeding Amount for Age: 30-45 ml (1-1.5 oz)    Interventions: Assisted mother to position infant in football hold to right breast. Infant eagerly gaped wide, latched with minimal effort. Mother immediately verbalizes improvement in latch and comfort in this position. Infant nursed well for 5 mins before becoming sleepy, requiring mother to stimulate for sucks. Deep jaw drops with audible gulping swallows noted frequently. Praise provided. Mother more independently positioned and latched infant on left breast in football hold. GREAT JOB!!!     Education: positioning  S/s/tx of plugged ducts, engorgement and mastitis  Nipple care  Average feeding amounts    Notified MD/ Orders Received: NA    Feeding Plan:  Continue feeding Osmel on demand with hunger cues, waking her every 3 hours. Continue to offer " both breasts during each feeding session. Continue skin to skin feedings to help stimulate her to stay awake more easily.     Plan of Care:    Interventions accomplished satisfactorily, requires no further action.    Future Appointments:    Lactation: As desired or needed by mother    Physician: Dr. Matute Monday, August 30th at 1315    Signature: Darline De León RN, BSN